# Patient Record
Sex: FEMALE | Race: WHITE
[De-identification: names, ages, dates, MRNs, and addresses within clinical notes are randomized per-mention and may not be internally consistent; named-entity substitution may affect disease eponyms.]

---

## 2017-05-20 ENCOUNTER — HOSPITAL ENCOUNTER (OUTPATIENT)
Dept: HOSPITAL 45 - C.LABPVFM | Age: 62
Discharge: HOME | End: 2017-05-20
Attending: PSYCHIATRY & NEUROLOGY
Payer: COMMERCIAL

## 2017-05-20 DIAGNOSIS — F22: Primary | ICD-10-CM

## 2017-05-20 LAB
ALBUMIN/GLOB SERPL: 1.2 {RATIO} (ref 0.9–2)
ALP SERPL-CCNC: 100 U/L (ref 45–117)
ALT SERPL-CCNC: 32 U/L (ref 12–78)
ANION GAP SERPL CALC-SCNC: 5 MMOL/L (ref 3–11)
AST SERPL-CCNC: 25 U/L (ref 15–37)
BUN SERPL-MCNC: 18 MG/DL (ref 7–18)
BUN/CREAT SERPL: 17.9 (ref 10–20)
CALCIUM SERPL-MCNC: 8.8 MG/DL (ref 8.5–10.1)
CHLORIDE SERPL-SCNC: 111 MMOL/L (ref 98–107)
CHOLEST/HDLC SERPL: 1.8 {RATIO}
CO2 SERPL-SCNC: 29 MMOL/L (ref 21–32)
CREAT SERPL-MCNC: 0.98 MG/DL (ref 0.6–1.2)
GLOBULIN SER-MCNC: 3.1 GM/DL (ref 2.5–4)
GLUCOSE SERPL-MCNC: 88 MG/DL (ref 70–99)
GLUCOSE UR QL: 78 MG/DL
KETONES UR QL STRIP: 54 MG/DL
NITRITE UR QL STRIP: 39 MG/DL (ref 0–150)
PH UR: 140 MG/DL (ref 0–200)
POTASSIUM SERPL-SCNC: 4.3 MMOL/L (ref 3.5–5.1)
SODIUM SERPL-SCNC: 145 MMOL/L (ref 136–145)
VERY LOW DENSITY LIPOPROT CALC: 8 MG/DL

## 2017-07-01 ENCOUNTER — HOSPITAL ENCOUNTER (OUTPATIENT)
Dept: HOSPITAL 45 - C.LABPVFM | Age: 62
Discharge: HOME | End: 2017-07-01
Attending: FAMILY MEDICINE
Payer: COMMERCIAL

## 2017-07-01 DIAGNOSIS — G93.9: Primary | ICD-10-CM

## 2017-07-01 LAB
ALT SERPL-CCNC: 29 U/L (ref 12–78)
CHOLEST/HDLC SERPL: 2.2 {RATIO}
GLUCOSE UR QL: 62 MG/DL
KETONES UR QL STRIP: 61 MG/DL
NITRITE UR QL STRIP: 55 MG/DL (ref 0–150)
PH UR: 134 MG/DL (ref 0–200)
VERY LOW DENSITY LIPOPROT CALC: 11 MG/DL

## 2018-01-20 ENCOUNTER — HOSPITAL ENCOUNTER (OUTPATIENT)
Dept: HOSPITAL 45 - C.LAB | Age: 63
Discharge: HOME | End: 2018-01-20
Attending: FAMILY MEDICINE
Payer: COMMERCIAL

## 2018-01-20 DIAGNOSIS — G93.9: Primary | ICD-10-CM

## 2018-01-20 LAB
ALT SERPL-CCNC: 54 U/L (ref 12–78)
BUN SERPL-MCNC: 12 MG/DL (ref 7–18)
CALCIUM SERPL-MCNC: 9.2 MG/DL (ref 8.5–10.1)
CO2 SERPL-SCNC: 27 MMOL/L (ref 21–32)
CREAT SERPL-MCNC: 0.85 MG/DL (ref 0.6–1.2)
GLUCOSE SERPL-MCNC: 91 MG/DL (ref 70–99)
KETONES UR QL STRIP: 37 MG/DL
PH UR: 119 MG/DL (ref 0–200)
POTASSIUM SERPL-SCNC: 4 MMOL/L (ref 3.5–5.1)
SODIUM SERPL-SCNC: 140 MMOL/L (ref 136–145)

## 2018-02-06 ENCOUNTER — HOSPITAL ENCOUNTER (OUTPATIENT)
Dept: HOSPITAL 45 - C.LAB | Age: 63
Discharge: HOME | End: 2018-02-06
Attending: PSYCHIATRY & NEUROLOGY
Payer: COMMERCIAL

## 2018-02-06 DIAGNOSIS — F33.3: Primary | ICD-10-CM

## 2018-08-06 ENCOUNTER — HOSPITAL ENCOUNTER (INPATIENT)
Dept: HOSPITAL 45 - C.EDB | Age: 63
LOS: 8 days | Discharge: HOME | DRG: 918 | End: 2018-08-14
Attending: FAMILY MEDICINE | Admitting: HOSPITALIST
Payer: COMMERCIAL

## 2018-08-06 VITALS
HEIGHT: 64 IN | WEIGHT: 209 LBS | HEIGHT: 64 IN | BODY MASS INDEX: 35.68 KG/M2 | WEIGHT: 209 LBS | BODY MASS INDEX: 35.68 KG/M2

## 2018-08-06 VITALS
DIASTOLIC BLOOD PRESSURE: 84 MMHG | HEART RATE: 80 BPM | OXYGEN SATURATION: 99 % | SYSTOLIC BLOOD PRESSURE: 143 MMHG | TEMPERATURE: 99.32 F

## 2018-08-06 VITALS
DIASTOLIC BLOOD PRESSURE: 75 MMHG | SYSTOLIC BLOOD PRESSURE: 119 MMHG | OXYGEN SATURATION: 100 % | HEART RATE: 79 BPM | TEMPERATURE: 100.58 F

## 2018-08-06 VITALS
DIASTOLIC BLOOD PRESSURE: 84 MMHG | OXYGEN SATURATION: 99 % | HEART RATE: 80 BPM | TEMPERATURE: 99.32 F | SYSTOLIC BLOOD PRESSURE: 143 MMHG

## 2018-08-06 VITALS — OXYGEN SATURATION: 99 %

## 2018-08-06 DIAGNOSIS — T45.511A: Primary | ICD-10-CM

## 2018-08-06 DIAGNOSIS — F32.9: ICD-10-CM

## 2018-08-06 DIAGNOSIS — N13.30: ICD-10-CM

## 2018-08-06 DIAGNOSIS — F22: ICD-10-CM

## 2018-08-06 DIAGNOSIS — R31.0: ICD-10-CM

## 2018-08-06 DIAGNOSIS — K21.9: ICD-10-CM

## 2018-08-06 DIAGNOSIS — K92.1: ICD-10-CM

## 2018-08-06 DIAGNOSIS — Z92.3: ICD-10-CM

## 2018-08-06 DIAGNOSIS — Z85.3: ICD-10-CM
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
## 2018-08-06 DIAGNOSIS — Z91.14: ICD-10-CM

## 2018-08-06 DIAGNOSIS — N17.9: ICD-10-CM

## 2018-08-06 DIAGNOSIS — Z88.6: ICD-10-CM

## 2018-08-06 DIAGNOSIS — Z86.711: ICD-10-CM

## 2018-08-06 DIAGNOSIS — Z88.8: ICD-10-CM

## 2018-08-06 DIAGNOSIS — Z79.01: ICD-10-CM

## 2018-08-06 DIAGNOSIS — Z88.2: ICD-10-CM

## 2018-08-06 DIAGNOSIS — D62: ICD-10-CM

## 2018-08-06 DIAGNOSIS — Z82.49: ICD-10-CM

## 2018-08-06 LAB
ALBUMIN SERPL-MCNC: 2.9 GM/DL (ref 3.4–5)
ALP SERPL-CCNC: 65 U/L (ref 45–117)
ALT SERPL-CCNC: 17 U/L (ref 12–78)
AST SERPL-CCNC: 20 U/L (ref 15–37)
BASOPHILS # BLD: 0.01 K/UL (ref 0–0.2)
BASOPHILS NFR BLD: 0.2 %
BUN SERPL-MCNC: 65 MG/DL (ref 7–18)
CALCIUM SERPL-MCNC: 9.1 MG/DL (ref 8.5–10.1)
CO2 SERPL-SCNC: 23 MMOL/L (ref 21–32)
CREAT SERPL-MCNC: 5.49 MG/DL (ref 0.6–1.2)
EOS ABS #: 0.02 K/UL (ref 0–0.5)
EOSINOPHIL NFR BLD AUTO: 212 K/UL (ref 130–400)
GLUCOSE SERPL-MCNC: 109 MG/DL (ref 70–99)
HCT VFR BLD CALC: 30 % (ref 37–47)
HGB BLD-MCNC: 10.1 G/DL (ref 12–16)
IG#: 0.02 K/UL (ref 0–0.02)
IMM GRANULOCYTES NFR BLD AUTO: 8.4 %
INR PPP: 1 (ref 0.9–1.1)
INR PPP: > 10 (ref 0.9–1.1)
LYMPHOCYTES # BLD: 0.54 K/UL (ref 1.2–3.4)
MCH RBC QN AUTO: 28.9 PG (ref 25–34)
MCHC RBC AUTO-ENTMCNC: 33.7 G/DL (ref 32–36)
MCV RBC AUTO: 85.7 FL (ref 80–100)
MONO ABS #: 0.31 K/UL (ref 0.11–0.59)
MONOCYTES NFR BLD: 4.8 %
NEUT ABS #: 5.51 K/UL (ref 1.4–6.5)
NEUTROPHILS # BLD AUTO: 0.3 %
NEUTROPHILS NFR BLD AUTO: 86 %
PMV BLD AUTO: 10.7 FL (ref 7.4–10.4)
POTASSIUM SERPL-SCNC: 4.1 MMOL/L (ref 3.5–5.1)
PROT SERPL-MCNC: 6.6 GM/DL (ref 6.4–8.2)
PTT PATIENT: 182.3 SECONDS (ref 21–31)
RED CELL DISTRIBUTION WIDTH CV: 13.8 % (ref 11.5–14.5)
RED CELL DISTRIBUTION WIDTH SD: 43.4 FL (ref 36.4–46.3)
SODIUM SERPL-SCNC: 136 MMOL/L (ref 136–145)
WBC # BLD AUTO: 6.41 K/UL (ref 4.8–10.8)

## 2018-08-06 RX ADMIN — SODIUM CHLORIDE SCH MLS/HR: 900 INJECTION, SOLUTION INTRAVENOUS at 21:00

## 2018-08-06 NOTE — DIAGNOSTIC IMAGING REPORT
CHEST ONE VIEW PORTABLE



CLINICAL HISTORY: chest pain dyspnea



COMPARISON STUDY:  6/6/2018



FINDINGS: Lungs are clear. Postoperative changes left axilla. Diaphragms smooth.





IMPRESSION:  No acute process. 











The above report was generated using voice recognition software.  It may contain

grammatical, syntax or spelling errors.









Electronically signed by:  Avery Howard M.D.

8/6/2018 5:25 PM



Dictated Date/Time:  8/6/2018 5:25 PM

## 2018-08-06 NOTE — EMERGENCY ROOM VISIT NOTE
History


First contact with patient:  12:37


Chief Complaint:  ILLNESS


Stated Complaint:  BOWELS BLOCKED,BLOOD IN URIN,SOB,SORE THROAT,FAINT





History of Present Illness


The patient is a 62 year old female who presents to the Emergency Room with 

complaints of hematuria, hematochezia, and abdominal pain. The patient reports 

symptoms for approximately 1 week. She states she has intermittently noticed 

blood in her urine and stool, but these symptoms became worse in the past 3 

days. She states she is currently on Coumadin for history of PE's. She stopped 

taking her coumadin 3 days ago due to the blood in her urine and being out of 

town. She reports noticing bruising of her lower abdomen overnight and has felt 

as if she will faint. She reports "breathing problems" which she is unable to 

describe as well as "chest discomfort." The patient reports symptoms are 

similar to history of PE's she has had in the past. She reports associated 

fatigue, and edema and reports a sensation of fluid in her back. She reports 

pain in her abdomen and legs and describes it as achy and rates it 3/10. She 

has taken no medications for pain. Last INR was approximately 2 weeks ago and 

she states was 2.4. She denies any trauma or injury. She denies diarrhea, 

constipation, numbness or tingling, dysuria, urinary frequency, urinary 

hesitancy, flank pain, or other concerning symptoms.





Review of Systems


A complete 10 point review of systems was reviewed with the patient with 

pertinent positives and negatives as per history of present illness. All else 

were negative.





Past Medical/Surgical History


Medical Problems:


(1) Allergic asthma


(2) Breast CA


(3) Chest pain


(4) Depression


(5) Elevated INR


(6) GERD (gastroesophageal reflux disease)


(7) Lymphedema


(8) Pulmonary embolism


(9) Renal hemorrhage, right


(10) Schizophrenia


(11) Varicose veins during pregnancy, antepartum


Surgical Problems:


(1) S/P mastectomy, bilateral


(2) S/P SHAMEKA-BSO








Family History





Heart disease


Hypertension





Social History


Smoking Status:  Never Smoker


Smokeless Tobacco Use:  No


Alcohol Use:  none


Drug Use:  none


Marital Status:  single


Housing Status:  lives with family


Occupation Status:  retired





Current/Historical Medications


Scheduled


Atorvastatin (Lipitor), 10 MG PO DAILY


Cholecalciferol (Vitamin D3), 1 TAB PO DAILY


Lurasidone Hcl (Latuda), 60 MG PO DAILY


Warfarin Sod (Jantoven), 7.5 MG PO 6XWK


Warfarin Sodium (Warfarin Sodium), 5 MG PO WK





Scheduled PRN


Albuterol Hfa (Ventolin Hfa), 2 PUFFS INH Q6H PRN for Shortness of Breath


Fluticasone Propionate (Nasal) (Flonase Allergy Relief), 2 SPRAYS KATIA QAM PRN 

for ALLGERY SYMPTOMS


Loratadine & Pseudoephedrine (Loratadine-D 12HR), 10 MG PO QAM PRN for Allergy 

Symptoms


Mometasone Furoate (Inhalation (Asmanex Twisthaler 120 Me), 1 PUFF INH DAILY 

PRN for Nasal Congestion


Polyethylene Glycol-Propylene (Systane), 2 DROPS OPB BID PRN for DRYNESS


Rabeprazole Sodium (Aciphex), 20 MG PO QAM PRN for Reflux





Physical Exam


Vital Signs











  Date Time  Temp Pulse Resp B/P (MAP) Pulse Ox O2 Delivery O2 Flow Rate FiO2


 


8/6/18 17:00  72 16 145/103 98 Room Air  


 


8/6/18 15:01  75 16 134/76 97 Room Air  


 


8/6/18 14:01    135/91    


 


8/6/18 13:53  76      


 


8/6/18 13:41  76 19  99   


 


8/6/18 13:33    120/77    


 


8/6/18 13:07     96 Room Air  


 


8/6/18 12:08 37.3 86 18 118/81 96 Room Air  











Physical Exam


VITALS: Vitals are noted on the nurse's note and reviewed by myself.  Vital 

signs stable.


GENERAL: This is a 62-year-old white female, in no acute distress, 

nondiaphoretic, well-developed well-nourished.


SKIN: Ecchymosis over the suprapubic area.  There is also ecchymosis to a 

lesser degree on the bilateral lower extremities.  Otherwise the skin was 

without rashes, erythema, edema, or bruising.  There is no tenting of the skin.

  Capillary reflex less than 2 seconds.


HEAD: Normocephalic atraumatic.  


EARS: External auditory canals clear, tympanic membranes pearly gray without 

erythema or effusion bilaterally.


EYES: Pupils equal round and reactive to light and accommodation.  Conjunctivae 

without injection, sclerae without icterus.  Extraocular movements intact.  


NOSE: Patent, turbinates without inflammation or discharge.  No sinus 

tenderness.


MOUTH: Mucous membranes moist.  Tonsils are not enlarged.  Pharynx without 

erythema or exudate.  Uvula midline.  Airway patent.  Tongue does not deviate.  


NECK: Supple without nuchal rigidity.  No lymphadenopathy.  No thyromegaly.  

Cervical spine is nontender.  No JVD.


HEART: Regular rate and rhythm without murmurs gallops or rubs.


LUNGS: Clear to auscultation bilaterally without wheezes, rales or rhonchi.  No 

dullness to percussion.  No retractions or accessory muscle use.


ABDOMEN: Positive bowel sounds x 4.  Normal tympanic percussion.  Suprapubic 

tenderness to palpation.  Abdomen is otherwise soft, nontender, without masses 

or organomegaly.  Campo sign negative.  No guarding or rebound tenderness.


MUSCULOSKELETAL: No muscle atrophy, erythema, or edema noted.  Full range of 

motion without joint tenderness in all extremities.  No tenderness to 

palpation.  Normal gait.  Strength 5/5 throughout.


NEURO: Patient was alert and oriented to person place and time.  Normal 

sensation to light and sharp touch.  Deep tendon reflexes 2+ throughout.  No 

focal neurological deficits.





Medical Decision & Procedures


ER Provider


Diagnostic Interpretation:


ABD/PELVIS NO IV OR ORAL CONT





CT DOSE: 691.69 mGy.cm





HISTORY: Pain  abdominal pain, hematuria, hematochezia, on coumadin





TECHNIQUE: Multiaxial CT images of the abdomen and pelvis were performed without


contrast.  A dose lowering technique was utilized adhering to the principles of


ALARA.








COMPARISON STUDY: None.





FINDINGS: Lung bases are clear. Liver is homogeneous throughout. Pancreas


appears unremarkable. Gallbladder is contracted.





Both kidneys are enlarged compared to the prior exam. There is high density


material within the renal collecting systems. There are findings of perinephric


infiltrative changes bilaterally.





Both ureters are moderately distended and again with high density material


within. This presumably represents blood. Bladder is relatively collapsed. There


are nonspecific infiltrative changes of the subcutaneous fat over the left


inguinal region. There is trace amount of free fluid within the pelvic


cul-de-sac.





IMPRESSION: 





1. Interval increase in size of both kidneys demonstrating diffuse heterogeneity


as well as considerable infiltrative change of the perinephric fat]


2. In addition, there is distention of the renal collecting systems with


hyperdense material with dilatation of the ureters throughout their length again


containing high density material components.





Trace amount of free fluid within the pelvic cul-de-sac.


3. Nonobstructive bowel pattern.








4. This appearance is most consistent with that of spontaneous bilateral renal


hemorrhage with extension of blood to the perinephric spaces as well as the


renal collecting systems and ureters.














The above report was generated using voice recognition software.  It may contain


grammatical, syntax or spelling errors.











Electronically signed by:  Avery Howard M.D.


8/6/2018 3:52 PM





Dictated Date/Time:  8/6/2018 3:44 PM








CHEST ONE VIEW PORTABLE





CLINICAL HISTORY: chest pain dyspnea





COMPARISON STUDY:  6/6/2018





FINDINGS: Lungs are clear. Postoperative changes left axilla. Diaphragms smooth.








IMPRESSION:  No acute process. 

















The above report was generated using voice recognition software.  It may contain


grammatical, syntax or spelling errors.














Electronically signed by:  Avery Howard M.D.


8/6/2018 5:25 PM





Dictated Date/Time:  8/6/2018 5:25 PM





Laboratory Results


8/6/18 13:14








Red Blood Count 3.50, Mean Corpuscular Volume 85.7, Mean Corpuscular Hemoglobin 

28.9, Mean Corpuscular Hemoglobin Concent 33.7, Mean Platelet Volume 10.7, 

Neutrophils (%) (Auto) 86.0, Lymphocytes (%) (Auto) 8.4, Monocytes (%) (Auto) 

4.8, Eosinophils (%) (Auto) 0.3, Basophils (%) (Auto) 0.2, Neutrophils # (Auto) 

5.51, Lymphocytes # (Auto) 0.54, Monocytes # (Auto) 0.31, Eosinophils # (Auto) 

0.02, Basophils # (Auto) 0.01





8/6/18 13:14

















Test


  8/6/18


13:14 8/6/18


13:15 8/6/18


18:07


 


White Blood Count


  6.41 K/uL


(4.8-10.8) 


  


 


 


Red Blood Count


  3.50 M/uL


(4.2-5.4) 


  


 


 


Hemoglobin


  10.1 g/dL


(12.0-16.0) 


  


 


 


Hematocrit 30.0 % (37-47)   


 


Mean Corpuscular Volume


  85.7 fL


() 


  


 


 


Mean Corpuscular Hemoglobin


  28.9 pg


(25-34) 


  


 


 


Mean Corpuscular Hemoglobin


Concent 33.7 g/dl


(32-36) 


  


 


 


Platelet Count


  212 K/uL


(130-400) 


  


 


 


Mean Platelet Volume


  10.7 fL


(7.4-10.4) 


  


 


 


Neutrophils (%) (Auto) 86.0 %   


 


Lymphocytes (%) (Auto) 8.4 %   


 


Monocytes (%) (Auto) 4.8 %   


 


Eosinophils (%) (Auto) 0.3 %   


 


Basophils (%) (Auto) 0.2 %   


 


Neutrophils # (Auto)


  5.51 K/uL


(1.4-6.5) 


  


 


 


Lymphocytes # (Auto)


  0.54 K/uL


(1.2-3.4) 


  


 


 


Monocytes # (Auto)


  0.31 K/uL


(0.11-0.59) 


  


 


 


Eosinophils # (Auto)


  0.02 K/uL


(0-0.5) 


  


 


 


Basophils # (Auto)


  0.01 K/uL


(0-0.2) 


  


 


 


RDW Standard Deviation


  43.4 fL


(36.4-46.3) 


  


 


 


RDW Coefficient of Variation


  13.8 %


(11.5-14.5) 


  


 


 


Immature Granulocyte % (Auto) 0.3 %   


 


Immature Granulocyte # (Auto)


  0.02 K/uL


(0.00-0.02) 


  


 


 


Activated Partial


Thromboplast Time 182.3 SECONDS


(21.0-31.0) 


  


 


 


Partial Thromboplastin Ratio 6.9   


 


Anion Gap


  10.0 mmol/L


(3-11) 


  


 


 


Est Creatinine Clear Calc


Drug Dose 12.2 ml/min 


  


  


 


 


Estimated GFR (


American) 8.8 


  


  


 


 


Estimated GFR (Non-


American 7.6 


  


  


 


 


BUN/Creatinine Ratio 11.7 (10-20)   


 


Calcium Level


  9.1 mg/dl


(8.5-10.1) 


  


 


 


Magnesium Level


  2.3 mg/dl


(1.8-2.4) 


  


 


 


Total Bilirubin


  0.8 mg/dl


(0.2-1) 


  


 


 


Aspartate Amino Transf


(AST/SGOT) 20 U/L (15-37) 


  


  


 


 


Alanine Aminotransferase


(ALT/SGPT) 17 U/L (12-78) 


  


  


 


 


Alkaline Phosphatase


  65 U/L


() 


  


 


 


Pro-B-Type Natriuretic Peptide


  1262 pg/ml


(0-900) 


  


 


 


Total Protein


  6.6 gm/dl


(6.4-8.2) 


  


 


 


Albumin


  2.9 gm/dl


(3.4-5.0) 


  


 


 


Globulin


  3.7 gm/dl


(2.5-4.0) 


  


 


 


Albumin/Globulin Ratio 0.8 (0.9-2)   


 


Urine Color  RED  


 


Urine Appearance  TURBID (CLEAR)  


 


Urine pH   (4.5-7.5)  


 


Urine Specific Gravity


  


  1.016


(1.000-1.030) 


 


 


Urine Protein   (NEG)  


 


Urine Glucose (UA)   (NEG)  


 


Urine Ketones   (NEG)  


 


Urine Occult Blood   (NEG)  


 


Urine Nitrite   (NEG)  


 


Urine Bilirubin   (NEG)  


 


Urine Urobilinogen   (NEG)  


 


Urine Leukocyte Esterase   (NEG)  


 


Urine RBC  >30 /hpf (0-4)  


 


Urine WBC  >30 /hpf (0-5)  


 


Urine Epithelial Cells


  


  20-30 /lpf


(0-5) 


 


 


Urine Bacteria  1+ (NEG)  


 


Urine Opiates Screen  NEG (NEG)  


 


Urine Methadone, Qualitative  NEG (NEG)  


 


Urine Barbiturates  NEG (NEG)  


 


Urine Phencyclidine (PCP)


Level 


  NEG (NEG) 


  


 


 


Ur


Amphetamine/Methamphetamine 


  NEG (NEG) 


  


 


 


MDMA (Ecstasy) Screen  NEG (NEG)  


 


Urine Benzodiazepines Screen  NEG (NEG)  


 


Urine Cocaine Metabolite  NEG (NEG)  


 


Urine Marijuana (THC)  NEG (NEG)  











Medications Administered











 Medications


  (Trade)  Dose


 Ordered  Sig/Angely


 Route  Start Time


 Stop Time Status Last Admin


Dose Admin


 


 Sodium Chloride  1,000 ml @ 


 999 mls/hr  Q1H1M STAT


 IV  8/6/18 12:56


 8/6/18 13:56 DC 8/6/18 13:23


999 MLS/HR


 


 Phytonadione 10


 mg/Sodium Chloride  51 ml @ 


 101 mls/hr  ONE  ONCE


 IV  8/6/18 14:45


 8/6/18 15:15 DC 8/6/18 14:56


101 MLS/HR


 


 Sodium Chloride  1,000 ml @ 


 999 mls/hr  Q1H1M STAT


 IV  8/6/18 16:21


 8/6/18 17:21 DC 8/6/18 16:28


999 MLS/HR


 


 Phytonadione


  (Mephyton Tab)  5 mg  NOW  STAT


 PO  8/6/18 16:39


 8/6/18 16:41 DC 8/6/18 17:26


5 MG


 


 Prothrombin


 Complex Concent


  (Human) 5000 unit/


 Syringe  200 ml @ 


 10 mls/min  TODAY@1715


 IV  8/6/18 17:15


 8/6/18 19:00 DC 8/6/18 17:26


10 MLS/MIN











ECG Per My Interpretation


Indication:  chest pain


Rate (beats per minute):  76


Rhythm:  normal sinus


Findings:  no acute ischemic change, no ectopy


Comparison ECG Date:  06/06/18


Change:  no significant change





ED Course


The patient was seen and evaluated as above.





IV access obtained, labs drawn.





Labs reviewed by myself.  The patient's creatinine was greater than 5, so a CT 

scan changed from CT with IV contrast to CT of the abdomen/pelvis without 

contrast.





I consulted with Dr. Delcid regarding the elevated INR and patient's 

creatinine.  She did recommend starting 10 mg IV vitamin K.  She did see and 

evaluate the patient.





Upon initiation of vitamin K, the patient immediately began experiencing chest 

discomfort and dyspnea.  Vitamin K was discontinued temporarily.  Repeat EKG 

performed and showed a ventricular rate of 72 bpm.  This was normal sinus 

rhythm with no acute ischemic changes.  There is no significant change from the 

previous EKG from today.





Imaging performed and reviewed by myself and radiologist as above.





I discussed the findings with the patient at bedside.  I recommended we restart 

the IV vitamin K slowly and monitor closely.  The patient was agreeable.  I 

consulted again with Dr. Delcid.  She recommended restarting the IV vitamin K 

and giving the patient 5 mg oral vitamin K.  She also recommended starting K 

Centra at 5000 units.





The patient did tolerate the IV vitamin K without adverse reaction.





I discussed the case with the [facility] hospitalist, Dr. Pinedo.  She did 

agree to accept the patient to her service and states she will consult with 

nephrology/urology.  I did advise her of Dr. Delcid's involvement and 

recommendations.





Throughout the course of the patient's care, I did work very closely with Dr. Mckeon.





Please see Dr. Pinedo's dictation regarding admission and ongoing care and 

management of this patient.





Medical Decision


This is a 62-year-old female patient presents emergency department today with 

multiple complaints.  She reports blood in her urine and stool which appears to 

be the primary complaint.  She is currently anticoagulated on Coumadin due to 

previous pulmonary emboli.  On examination, the patient was stable, and minimal 

pain, no distress, and vital signs were without tachycardia or hypotension.  

EKG did not reveal any acute findings or concerns for etiology of the patient's 

symptoms.  Laboratory workup was concerning for some very mild anemia with a 

hemoglobin of 10.  No leukocytosis or thrombocytopenia.  CMP did not reveal any 

significant hepatic or electrolyte abnormalities.  The patient's creatinine was 

significantly elevated at 5.49.  Comparatively, the patient had a normal 

creatinine of 1.13 in June of this year.  The patient's BNP is elevated at 

1262.  Albumin low at 2.9.  The patient's initial INR was greater than 10 with 

a PT greater than 100.  APTT was 182.3.  Comparatively, the patient had an INR 

of 2.5 on July 18.  Repeat INR after being given IV vitamin K and K Centra was 

1.0 with a PT of 10.2.  The patient was seen and evaluated by Dr. Delcid, who 

reports a psychiatric history with concern for malingering versus drug abuse in 

the past.  She did recommend a urine drug screen.  This was performed and was 

negative.  The patient's urinalysis showed a turbid, red urine with greater 

than 30 red blood cells, greater than 30 white blood cells, and 1+ bacteria.  

Chest CT scan was performed and was concerning for distention of the renal 

collecting systems with hyperdense material and dilation of the ureters 

throughout their length, concerning for blood.  I suspect this is related to 

the patient's elevated INR.  The history is concerning and unclear. The patient 

reports not taking her Warfarin the past 3 days, however, it is unclear if she 

was taking it properly prior to this. She states her diet has been different 

recently, so perhaps this is contributing to the elevated INR. The patient will 

require admission due to DAVID and for medication management to stabilize her INR 

back to a therapeutic level. The patient was agreeable.





Etiologies such as intra-abdominal hemorrhage, hypocoagulable state, 

appendicitis, diverticulitis,  obstruction, inflammatory bowel disease, renal 

colic, PUD, biliary pathology, renal pathology, pancreatitis, mesenteric 

ischemia, aortic pathology, infections, genitourinary, UTI, perforated viscus, 

cardiac ischemia, aortic dissection, pulmonary embolism, pneumonia, pneumothorax

, musculoskeletal, infections, gastrointestinal, as well as others were 

entertained.  





The chart was completed utilizing Dragon Speech voice recognition software. 

Grammatical errors, random word insertions, pronoun errors, and incomplete 

sentences are an occasional consequence of this system due to software 

limitations, ambient noise, and hardware issues. Any formal questions or 

concerns about the content, text, or information contained within the body of 

this dictation should be directly addressed to the provider for clarification.





Medication Reconcilliation


Current Medication List:  was personally reviewed by me





Blood Pressure Screening


Patient's blood pressure:  Normal blood pressure





Impression





 Primary Impression:  


 Elevated INR


 Additional Impressions:  


 Anticoagulated on warfarin


 Acute kidney injury


 Renal hematuria


 Anemia





Departure Information


Dispostion


Admitted as an inpatient





Condition


GOOD





Referrals


Dee Dee You MD (PCP)





Patient Instructions


[facility]





Problem Qualifiers








 Additional Impressions:  


 Anemia


 Anemia type:  unspecified type  Qualified Codes:  D64.9 - Anemia, unspecified

## 2018-08-06 NOTE — DIAGNOSTIC IMAGING REPORT
ABD/PELVIS NO IV OR ORAL CONT



CT DOSE: 691.69 mGy.cm



HISTORY: Pain  abdominal pain, hematuria, hematochezia, on coumadin



TECHNIQUE: Multiaxial CT images of the abdomen and pelvis were performed without

contrast.  A dose lowering technique was utilized adhering to the principles of

ALARA.





COMPARISON STUDY: None.



FINDINGS: Lung bases are clear. Liver is homogeneous throughout. Pancreas

appears unremarkable. Gallbladder is contracted.



Both kidneys are enlarged compared to the prior exam. There is high density

material within the renal collecting systems. There are findings of perinephric

infiltrative changes bilaterally.



Both ureters are moderately distended and again with high density material

within. This presumably represents blood. Bladder is relatively collapsed. There

are nonspecific infiltrative changes of the subcutaneous fat over the left

inguinal region. There is trace amount of free fluid within the pelvic

cul-de-sac.



IMPRESSION: 



1. Interval increase in size of both kidneys demonstrating diffuse heterogeneity

as well as considerable infiltrative change of the perinephric fat]

2. In addition, there is distention of the renal collecting systems with

hyperdense material with dilatation of the ureters throughout their length again

containing high density material components.



Trace amount of free fluid within the pelvic cul-de-sac.

3. Nonobstructive bowel pattern.





4. This appearance is most consistent with that of spontaneous bilateral renal

hemorrhage with extension of blood to the perinephric spaces as well as the

renal collecting systems and ureters.









The above report was generated using voice recognition software.  It may contain

grammatical, syntax or spelling errors.







Electronically signed by:  Avery Howard M.D.

8/6/2018 3:52 PM



Dictated Date/Time:  8/6/2018 3:44 PM

## 2018-08-06 NOTE — HISTORY AND PHYSICAL
History & Physical


Date & Time of Service:


Aug 6, 2018 at 20:53


Chief Complaint:


Elevated Inr


Primary Care Physician:


Dee Dee You MD


History of Present Illness


Source:  patient


Ms. Bolanos is a 63yo female with history of multiple PEs on lifelong 

anticoagulation with Coumadin presenting with elevated INR of > 10 as well as 

spontaneous bleeding.  Patient reports 1 week of decreased energy as well as 

hematuria, bright red and sometimes pink, no clots and melena x 3 episodes (3 

August, 5 August and this AM).  Also with diffuse body pain, back pain and 

joint pain and lower abdominal pain.  She has been experiencing shortness of 

breath and presyncope over the last 4 days as well as easy bruising.  Patient 

reports not taking her Coumadin for the last 3 days due to concern for 

bleeding.  





On arrival to the ER her INR was found to be > 10.  Last INR 2 weeks ago was 

2.4.  IV Vitamin K was administered with complaint of flushing





ER Course:  PCC, Vitamin K 10mg IV, Vitamin K 5mg PO, NSS x 2 liters





Past Medical/Surgical History


Medical Problems:


Allergic asthma


PE


Chronic anticoagulation


GERD


Breast CA s/p bilateral mastectomy, chemo, XRT


Depression


Schizophrenia


Lymphedema





Surgical Problems:


(1) S/P mastectomy, bilateral


(2) S/P SHAMEKA-BSO


Cataract - bilateral


Bladder repair





Family History





Heart disease


Hypertension





Social History


Smoking Status:  Never Smoker


Smokeless Tobacco Use:  No


Drug Use:  none


Marital Status:  single


Housing status:  lives with family


Occupational Status:  retired





Immunizations


History of Influenza Vaccine:  N/A


History of Tetanus Vaccine?:  No


History of Pneumococcal:  Yes


History of Hepatitis B Vaccine:  No





Allergies


Coded Allergies:  


     Celecoxib (Verified  Allergy, Severe, airway edema, 8/6/18)


     Acarides (Mites) (Verified  Allergy, Mild, unknown, 8/6/18)


     Cat Dander (Verified  Allergy, Mild, unknown, 8/6/18)


     Dog Dander (Verified  Allergy, Mild, unknown, 8/6/18)


     Dust (Verified  Allergy, Mild, unknown, 8/6/18)


     Grass (Verified  Allergy, Mild, unknown, 8/6/18)


     Molds & Smuts (Verified  Allergy, Mild, unknown, 8/6/18)


     Clindamycin (Verified  Allergy, Unknown, unknown, 8/6/18)


     Sulfa Antibiotics (Verified  Allergy, Unknown, ., 8/6/18)


     Amoxicillin (Verified  Adverse Reaction, Mild, abdominal pain, 8/6/18)


     Ibuprofen (Verified  Adverse Reaction, Unknown, unknown, 8/6/18)


     Rofecoxib (Verified  Adverse Reaction, Unknown, unknown, 8/6/18)





Home Medications


Scheduled


Atorvastatin (Lipitor), 10 MG PO DAILY


Cholecalciferol (Vitamin D3), 1 TAB PO DAILY


Lurasidone Hcl (Latuda), 60 MG PO DAILY


Warfarin Sod (Jantoven), 7.5 MG PO 6XWK


Warfarin Sodium (Warfarin Sodium), 5 MG PO WK





Scheduled PRN


Albuterol Hfa (Ventolin Hfa), 2 PUFFS INH Q6H PRN for Shortness of Breath


Fluticasone Propionate (Nasal) (Flonase Allergy Relief), 2 SPRAYS KATIA QAM PRN 

for ALLGERY SYMPTOMS


Loratadine & Pseudoephedrine (Loratadine-D 12HR), 10 MG PO QAM PRN for Allergy 

Symptoms


Mometasone Furoate (Inhalation (Asmanex Twisthaler 120 Me), 1 PUFF INH DAILY 

PRN for Nasal Congestion


Polyethylene Glycol-Propylene (Systane), 2 DROPS OPB BID PRN for DRYNESS


Rabeprazole Sodium (Aciphex), 20 MG PO QAM PRN for Reflux





Review of Systems


Constitutional:  + problem reported (reports shaking since arriving to ER), No 

fever, No chills, No sweats


Eyes:  No worsening of vision, No eye pain


ENT:  + sore throat, No hearing loss, No unusual epistaxis


Respiratory:  + shortness of breath, No cough


Abdomen:  No pain, No nausea, No vomiting, No diarrhea, No constipation


Musculoskeletal:  + joint pain, + muscle pain


Genitourinary - Female:  + hematuria


Neurologic:  No numbness/tingling


Endocrine:  + fatigue


Hematologic / Lymphatic:  + abnormal bleeding/bruising


Integumentary:  No rash





Physical Exam


Vital Signs











  Date Time  Temp Pulse Resp B/P (MAP) Pulse Ox O2 Delivery O2 Flow Rate FiO2


 


8/6/18 20:08 37.4 80 18 143/84 99 Room Air  


 


8/6/18 18:45 37.4 80 18 143/84 (103) 99 Room Air  


 


8/6/18 18:27  70 18 156/76 99   


 


8/6/18 17:00  72 16 145/103 98 Room Air  


 


8/6/18 15:01  75 16 134/76 97 Room Air  


 


8/6/18 14:01    135/91    


 


8/6/18 13:53  76      


 


8/6/18 13:41  76 19  99   


 


8/6/18 13:33    120/77    


 


8/6/18 13:07     96 Room Air  


 


8/6/18 12:08 37.3 86 18 118/81 96 Room Air  











Diagnostics


Laboratory Results





Results Past 24 Hours








Test


  8/6/18


13:14 8/6/18


13:15 8/6/18


18:07 8/6/18


19:02 Range/Units


 


 


White Blood Count 6.41    4.8-10.8  K/uL


 


Red Blood Count 3.50    4.2-5.4  M/uL


 


Hemoglobin 10.1    12.0-16.0  g/dL


 


Hematocrit 30.0    37-47  %


 


Mean Corpuscular Volume 85.7      fL


 


Mean Corpuscular Hemoglobin 28.9    25-34  pg


 


Mean Corpuscular Hemoglobin


Concent 33.7


  


  


  


  32-36  g/dl


 


 


Platelet Count 212    130-400  K/uL


 


Mean Platelet Volume 10.7    7.4-10.4  fL


 


Neutrophils (%) (Auto) 86.0     %


 


Lymphocytes (%) (Auto) 8.4     %


 


Monocytes (%) (Auto) 4.8     %


 


Eosinophils (%) (Auto) 0.3     %


 


Basophils (%) (Auto) 0.2     %


 


Neutrophils # (Auto) 5.51    1.4-6.5  K/uL


 


Lymphocytes # (Auto) 0.54    1.2-3.4  K/uL


 


Monocytes # (Auto) 0.31    0.11-0.59  K/uL


 


Eosinophils # (Auto) 0.02    0-0.5  K/uL


 


Basophils # (Auto) 0.01    0-0.2  K/uL


 


RDW Standard Deviation 43.4    36.4-46.3  fL


 


RDW Coefficient of Variation 13.8    11.5-14.5  %


 


Immature Granulocyte % (Auto) 0.3     %


 


Immature Granulocyte # (Auto) 0.02    0.00-0.02  K/uL


 


Prothrombin Time


  > 100.0


  


  


  10.2


  9.0-12.0


SECONDS


 


Prothromb Time International


Ratio > 10.0


  


  


  1.0


  0.9-1.1  


 


 


Activated Partial


Thromboplast Time 182.3


  


  


  


  21.0-31.0


SECONDS


 


Partial Thromboplastin Ratio 6.9     


 


Sodium Level 136    136-145  mmol/L


 


Potassium Level 4.1    3.5-5.1  mmol/L


 


Chloride Level 103      mmol/L


 


Carbon Dioxide Level 23    21-32  mmol/L


 


Anion Gap 10.0    3-11  mmol/L


 


Blood Urea Nitrogen 65    7-18  mg/dl


 


Creatinine


  5.49


  


  


  


  0.60-1.20


mg/dl


 


Est Creatinine Clear Calc


Drug Dose 12.2


  


  


  


   ml/min


 


 


Estimated GFR (


American) 8.8


  


  


  


   


 


 


Estimated GFR (Non-


American 7.6


  


  


  


   


 


 


BUN/Creatinine Ratio 11.7    10-20  


 


Random Glucose 109    70-99  mg/dl


 


Calcium Level 9.1    8.5-10.1  mg/dl


 


Magnesium Level 2.3    1.8-2.4  mg/dl


 


Total Bilirubin 0.8    0.2-1  mg/dl


 


Aspartate Amino Transf


(AST/SGOT) 20


  


  


  


  15-37  U/L


 


 


Alanine Aminotransferase


(ALT/SGPT) 17


  


  


  


  12-78  U/L


 


 


Alkaline Phosphatase 65      U/L


 


Pro-B-Type Natriuretic Peptide 1262    0-900  pg/ml


 


Total Protein 6.6    6.4-8.2  gm/dl


 


Albumin 2.9    3.4-5.0  gm/dl


 


Globulin 3.7    2.5-4.0  gm/dl


 


Albumin/Globulin Ratio 0.8    0.9-2  


 


Urine Color  RED    


 


Urine Appearance  TURBID   CLEAR  


 


Urine pH     4.5-7.5  


 


Urine Specific Gravity  1.016   1.000-1.030  


 


Urine Protein     NEG  


 


Urine Glucose (UA)     NEG  


 


Urine Ketones     NEG  


 


Urine Occult Blood     NEG  


 


Urine Nitrite     NEG  


 


Urine Bilirubin     NEG  


 


Urine Urobilinogen     NEG  


 


Urine Leukocyte Esterase     NEG  


 


Urine RBC  >30   0-4  /hpf


 


Urine WBC  >30   0-5  /hpf


 


Urine Epithelial Cells  20-30   0-5  /lpf


 


Urine Bacteria  1+   NEG  


 


Urine Opiates Screen  NEG   NEG  


 


Urine Methadone, Qualitative  NEG   NEG  


 


Urine Barbiturates  NEG   NEG  


 


Urine Phencyclidine (PCP)


Level 


  NEG


  


  


  NEG  


 


 


Ur


Amphetamine/Methamphetamine 


  NEG


  


  


  NEG  


 


 


MDMA (Ecstasy) Screen  NEG   NEG  


 


Urine Benzodiazepines Screen  NEG   NEG  


 


Urine Cocaine Metabolite  NEG   NEG  


 


Urine Marijuana (THC)  NEG   NEG  








Microbiology Results


8/6/18 Urine Culture, Received


         Pending





Diagnostic Radiology


CHEST ONE VIEW PORTABLE





CLINICAL HISTORY: chest pain dyspnea





COMPARISON STUDY:  6/6/2018





FINDINGS: Lungs are clear. Postoperative changes left axilla. Diaphragms smooth.








IMPRESSION:  No acute process. 


********************************************************************************

*******************************************


ABD/PELVIS NO IV OR ORAL CONT





CT DOSE: 691.69 mGy.cm





HISTORY: Pain  abdominal pain, hematuria, hematochezia, on coumadin





TECHNIQUE: Multiaxial CT images of the abdomen and pelvis were performed without


contrast.  A dose lowering technique was utilized adhering to the principles of


ALARA.








COMPARISON STUDY: None.





FINDINGS: Lung bases are clear. Liver is homogeneous throughout. Pancreas


appears unremarkable. Gallbladder is contracted.





Both kidneys are enlarged compared to the prior exam. There is high density


material within the renal collecting systems. There are findings of perinephric


infiltrative changes bilaterally.





Both ureters are moderately distended and again with high density material


within. This presumably represents blood. Bladder is relatively collapsed. There


are nonspecific infiltrative changes of the subcutaneous fat over the left


inguinal region. There is trace amount of free fluid within the pelvic


cul-de-sac.





IMPRESSION: 





1. Interval increase in size of both kidneys demonstrating diffuse heterogeneity


as well as considerable infiltrative change of the perinephric fat]


2. In addition, there is distention of the renal collecting systems with


hyperdense material with dilatation of the ureters throughout their length again


containing high density material components.





Trace amount of free fluid within the pelvic cul-de-sac.


3. Nonobstructive bowel pattern.








4. This appearance is most consistent with that of spontaneous bilateral renal


hemorrhage with extension of blood to the perinephric spaces as well as the


renal collecting systems and ureters.





EKG


The study demonstrates NSR at 76bpm, normal axis and intervals, no evidence of 

ischemia





Impression


Assessment and Plan


63yo female presenting with anemia, hematuria with suggestion of spontaneous 

renal hemorrhage, melena in setting of INR >10.  Patient reports compliance 

with her Coumadin dosage (7.5mg daily except on Sunday 5mg), no new medications

, no supplements or OTCs, no dietary changes, no ingestions reported.  

Administered IV and oral Vitamin K as well as KCentra.





1.  Super therapeutic INR with spontaneous bleeding - patient presently 

hemodynamically stable, s/p IV and PO Vitamin K and KCentra.  Hg=10 and Hct=30 

which is down from 15.5 and 45.8, respectively, on 6/6/18. 


-Hold Coumadin


-Check INR daily


-Trend CBC q 12 hours, transfuse for Hg <7, active bleeding


-Check Coumadin level - reference lab/send out


-Hematology consultation - appreciate assistance with this case





2.  DAVID - BUN=65, Cr-5.49 in setting of spontaneous renal hemorrhage


-Treatment as above


-Patient received NSS x 2 liters in ER, continue IVF, NSS at 80mL/hr x 2 liters


-Monitor I/O, BUN/Cr and electrolyts


-Avoid nephrotoxic agents


-Renal dosing where appropriate





3.  Melena - in setting of INR > 10


-Check FOBT


-Protonix 40mg IV daily while increased risk for bleeding





4.  Anemia - Hg=10, Hct=30 


-Due to acute blood loss


-Continue to monitor BID


-Transfuse if < 7, active bleeding or symptomatic





5.  Depression/Schizophrenia


-Continue Latuda





6.  Asthma - stable,


-Continue to monitor





7.  F/E/N - NSS at 80mL/hr, monitor electrolytes and replete as needed, regular 

diet as tolerated





8.  Ppx - TEDs to bilateral LE, Protonix





9.  Code - DNR per discussion with patient





10.  Dispo - admit to telemetry





Advanced Directives


Existing Living Will:  Yes


Existing Power of :  Yes





Resuscitation Status


DNR





VTE Prophylaxis


Will order VTE Prophylaxis:  Yes

## 2018-08-07 VITALS
SYSTOLIC BLOOD PRESSURE: 125 MMHG | DIASTOLIC BLOOD PRESSURE: 81 MMHG | OXYGEN SATURATION: 98 % | HEART RATE: 86 BPM | TEMPERATURE: 98.96 F

## 2018-08-07 VITALS
HEART RATE: 70 BPM | OXYGEN SATURATION: 98 % | SYSTOLIC BLOOD PRESSURE: 100 MMHG | DIASTOLIC BLOOD PRESSURE: 65 MMHG | TEMPERATURE: 98.6 F

## 2018-08-07 VITALS
TEMPERATURE: 99.32 F | HEART RATE: 74 BPM | DIASTOLIC BLOOD PRESSURE: 74 MMHG | SYSTOLIC BLOOD PRESSURE: 109 MMHG | OXYGEN SATURATION: 98 %

## 2018-08-07 VITALS
DIASTOLIC BLOOD PRESSURE: 77 MMHG | OXYGEN SATURATION: 98 % | SYSTOLIC BLOOD PRESSURE: 114 MMHG | HEART RATE: 69 BPM | TEMPERATURE: 98.24 F

## 2018-08-07 VITALS
HEART RATE: 76 BPM | SYSTOLIC BLOOD PRESSURE: 106 MMHG | OXYGEN SATURATION: 96 % | DIASTOLIC BLOOD PRESSURE: 70 MMHG | TEMPERATURE: 98.78 F

## 2018-08-07 VITALS
HEART RATE: 79 BPM | TEMPERATURE: 98.24 F | DIASTOLIC BLOOD PRESSURE: 81 MMHG | OXYGEN SATURATION: 99 % | SYSTOLIC BLOOD PRESSURE: 122 MMHG

## 2018-08-07 VITALS — TEMPERATURE: 98.6 F

## 2018-08-07 LAB
BASOPHILS # BLD: 0.01 K/UL (ref 0–0.2)
BASOPHILS NFR BLD: 0.2 %
BUN SERPL-MCNC: 58 MG/DL (ref 7–18)
BUN SERPL-MCNC: 65 MG/DL (ref 7–18)
CALCIUM SERPL-MCNC: 8 MG/DL (ref 8.5–10.1)
CALCIUM SERPL-MCNC: 8 MG/DL (ref 8.5–10.1)
CO2 SERPL-SCNC: 18 MMOL/L (ref 21–32)
CO2 SERPL-SCNC: 21 MMOL/L (ref 21–32)
CREAT SERPL-MCNC: 3.5 MG/DL (ref 0.6–1.2)
CREAT SERPL-MCNC: 4.45 MG/DL (ref 0.6–1.2)
EOS ABS #: 0.11 K/UL (ref 0–0.5)
EOSINOPHIL NFR BLD AUTO: 193 K/UL (ref 130–400)
GLUCOSE SERPL-MCNC: 89 MG/DL (ref 70–99)
GLUCOSE SERPL-MCNC: 96 MG/DL (ref 70–99)
HCT VFR BLD CALC: 24 % (ref 37–47)
HGB BLD-MCNC: 7.8 G/DL (ref 12–16)
IG#: 0.02 K/UL (ref 0–0.02)
IMM GRANULOCYTES NFR BLD AUTO: 12.6 %
INR PPP: 0.9 (ref 0.9–1.1)
LYMPHOCYTES # BLD: 0.53 K/UL (ref 1.2–3.4)
MCH RBC QN AUTO: 28.1 PG (ref 25–34)
MCHC RBC AUTO-ENTMCNC: 32.5 G/DL (ref 32–36)
MCV RBC AUTO: 86.3 FL (ref 80–100)
MONO ABS #: 0.34 K/UL (ref 0.11–0.59)
MONOCYTES NFR BLD: 8.1 %
NEUT ABS #: 3.21 K/UL (ref 1.4–6.5)
NEUTROPHILS # BLD AUTO: 2.6 %
NEUTROPHILS NFR BLD AUTO: 76 %
PMV BLD AUTO: 10.4 FL (ref 7.4–10.4)
POTASSIUM SERPL-SCNC: 3.9 MMOL/L (ref 3.5–5.1)
POTASSIUM SERPL-SCNC: 3.9 MMOL/L (ref 3.5–5.1)
RED CELL DISTRIBUTION WIDTH CV: 13.5 % (ref 11.5–14.5)
RED CELL DISTRIBUTION WIDTH SD: 43.3 FL (ref 36.4–46.3)
SODIUM SERPL-SCNC: 138 MMOL/L (ref 136–145)
SODIUM SERPL-SCNC: 139 MMOL/L (ref 136–145)
WBC # BLD AUTO: 4.22 K/UL (ref 4.8–10.8)

## 2018-08-07 RX ADMIN — SODIUM CHLORIDE SCH MLS/HR: 900 INJECTION, SOLUTION INTRAVENOUS at 13:10

## 2018-08-07 RX ADMIN — ATORVASTATIN CALCIUM SCH MG: 10 TABLET, FILM COATED ORAL at 09:22

## 2018-08-07 RX ADMIN — Medication SCH INTER.UNIT: at 09:22

## 2018-08-07 RX ADMIN — PANTOPRAZOLE SODIUM SCH MLS/MIN: 40 INJECTION, POWDER, FOR SOLUTION INTRAVENOUS at 13:11

## 2018-08-07 RX ADMIN — LURASIDONE HYDROCHLORIDE SCH MG: 40 TABLET, FILM COATED ORAL at 09:23

## 2018-08-07 RX ADMIN — HEPARIN SODIUM SCH UNIT: 10000 INJECTION, SOLUTION INTRAVENOUS; SUBCUTANEOUS at 21:13

## 2018-08-07 NOTE — MEDICAL CONSULT
Consultation Note


Date of Service


Aug 7, 2018.





Consultation Note


     Ms Bolanos is a 62 year old woman who is a patient in the [facility] 

Anticoagulation Clinic.  She enrolled in the clinic about 2 years ago and has 

been on a relatively stable (<5% variation in wafarin dosing ) since that time.

  Her INRs have been stable, with a 75% time in therapeutic range.  A singular 

INR greater than 4 was secondary to concomitant antibiotic therapy.  She is 

currently on 50 mg of warfarin weekly. Her last INR in the clinic was 2.5 on 

July 18, 2018.


     I was contacted by the ED at the time of her presentation on 8/6 and 

personally went to the ED to evaluate her.  Of note, her weight was increased 

from her previous AC clinic weight, but more notably her exam was remarkable 

for bruising on her back abdomen and extremities.  She told me that sometime 

during the week of August 1 she began having blood in her urine and her stools.

  She said that she was leaving town to "go for tea with the family" at the end 

of the week, and didn't want to disrupt those plans.  Since she knew her INR 

may be elevated, she stopped taking her coumadin.  By her report, her last dose 

was Thursday (8/2) and she missed doses on Friday, Saturday and Sunday.  She 

came to the ED on Monday. Her INR on arrival to the ED was >10.  A CT of the 

abdomen and pelvis was read as being "consistent with spontaneous bilateral 

renal hemorrhage with extension of blood to the perinephric spaces as well as 

the renal collecting systems and ureters".Her urine was bloody and her stool 

was heme positive.  I recommended that she receive 5,000 units of PCC Kcentra 

to reverse the warfarin and 10 mg IV vitamin K to sustain the effect.  She did 

report some chest discomfort when receiving the IV vitamin K, however an EKG 

was unremarkable and the infusion was able to be completed at a slower rate 

without incident.  Her INR was normalized as of 1902 on 8/6. 


     Based on what I know of the patient and also the metabolism of warfarin, 

there was an obvious change in her coumadin needs/ metabolism in the time 

period between her last INR on 7/18 and her approximate time of bleeding (on or 

around 8/1).  Her INR was greater than 10 despite the fact that she had held 

coumadin for 3 days.  I have never in my career seen such coumadin effect.  I 

interviewed Ms. Bolanos both yesterday and today regarding any potential 

interferences with her INR.  She denies missed or extra doses and reports using 

her pillbox consistently She denies APAP, over the counter medications, changes 

to diet or alcohol, ingestions of any known interfering food sources. These 

interferences would, in my opinion, be insufficient to account for this extreme 

coumadin effect. Of note, she did have a singular change in her medications, an 

increase in her dose of Latuda from 40 mg to 60 mg on 7/31/18.  This was done 

by Dr. Lara, her outpatient psychiatrist, who reports that her family noted 

that her behavior was somewhat atypical at that time.On review of her med 

history through [employer] , it was noted that she had been filling her warfarin 

prescription at the [facility]. Warfarin  had been ordered by her PCP  

by  Zara.  The patient had 180 tabs filled on June 25 (4 months worth) and 

another 180 tabs filled on 7/27.   When questioned, the patient reports 

dropping all of her coumadin into the toilet, which required getting a 

replacement bottle.  


Given the patient's psychiatric illness, the large number of "lost" coumadin 

tablets, the time frame of the events and the highly unusual presentation, I 

cannot rule out that she may have been taking additional doses of warfarin 

during this time.  A warfarin level has been ordered.  I believe (and Dr. Lara agrees) that a psychiatry consultation while in house may be helpful.  

I asked the patient if she would be agreeable and she is.  I have spoken to the 

hospitalist team who will place the consultation.  I will continue to follow 

with you.

## 2018-08-07 NOTE — UROLOGY CONSULTATION
History


General


Date of Service:


Aug 7, 2018.


Chief Complaint:  Hematuria, DAVID


Primary Care Physician:


Dee Dee You MD


Pt seen a urologist before?:  No





History of Present Illness


62 year old female, history of PE - taking Coumadin, with bilateral 

hydronephrosis and debris/clot in both collecting systems and hematuria.


Patient reported to ER with back pain, INR 10, which has been reversed.


CT abdomen & pelvis showing bilateral hydronephrosis and debris/clot in both 

collecting systems.


Patient denies pain this afternoon.


Denies nausea/vomiting. Denies fever/chills.


She is experiencing hematuria.


Denies difficulty passing urine, denies dysuria.


States that she has never seen a urologist before, has had UTI in the past 

managed by PCP.





Imaging


Imaging:  CT


Images were done at:  [facility]





Problem List


Medical Problems:


(1) Abdominal pain


Status: Acute  





(2) Acute kidney injury


Status: Acute  





(3) Anemia


Status: Acute  





(4) Anticoagulated on warfarin


Status: Acute  





(5) Paranoia


Status: Acute  





(6) Precordial chest pain


Status: Acute  





(7) Renal hematuria


Status: Acute  





(8) Schizophrenia


Status: Acute  





(9) UTI (urinary tract infection)


Status: Acute  





(10) UTI (urinary tract infection)


Status: Acute  








Past History


asthma, depression, GERD, pulmonary embolism, urinary tract infection, other (

schizophrenia, chronic anticoagulation, breast cancer)


Past Surgical History:  mastectomy





Family History





Heart disease


Hypertension





Social History


Hx Tobacco Use In Past Year?:  No


Alcohol:  occasional


Drug use:  none


Marital status:  single


Housing status:  lives with family


Occupation status:  retired





Immunizations


History of Influenza Vaccine:  N/A


History of Tetanus Vaccine?:  No


History of Pneumococcal:  Yes


History of Hepatitis B Vaccine:  No





Allergies


Coded Allergies:  


     Celecoxib (Verified  Allergy, Severe, airway edema, 8/6/18)


     Acarides (Mites) (Verified  Allergy, Mild, unknown, 8/6/18)


     Cat Dander (Verified  Allergy, Mild, unknown, 8/6/18)


     Dog Dander (Verified  Allergy, Mild, unknown, 8/6/18)


     Dust (Verified  Allergy, Mild, unknown, 8/6/18)


     Grass (Verified  Allergy, Mild, unknown, 8/6/18)


     Molds & Smuts (Verified  Allergy, Mild, unknown, 8/6/18)


     Clindamycin (Verified  Allergy, Unknown, unknown, 8/6/18)


     Sulfa Antibiotics (Verified  Allergy, Unknown, ., 8/6/18)


     Amoxicillin (Verified  Adverse Reaction, Mild, abdominal pain, 8/6/18)


     Ibuprofen (Verified  Adverse Reaction, Unknown, unknown, 8/6/18)


     Rofecoxib (Verified  Adverse Reaction, Unknown, unknown, 8/6/18)





Medications


Home Medications:





Home Meds and Scripts








 Medications  Dose


 Route/Sig


 Max Daily Dose Days Date Category Dose


Instructions


 


 Jantoven


  (Warfarin Sodium)


 7.5 Mg Tab  7.5 Mg


 PO 6XWK


    8/6/18 Reported  MON, TUES, WED, THURS, FRI, SAT


 


 Latuda


  (Lurasidone Hcl)


 60 Mg Tab  60 Mg


 PO DAILY


    8/6/18 Reported 


 


 Flonase Allergy


 Relief


  (Fluticasone


 Propionate


  (Nasal)) 50


 Mcg/Act Spr  2 Sprays


 KATIA QAM PRN


    6/6/18 Reported 


 


 Asmanex


 Twisthaler 120 Me


  (Mometasone


 Furoate


  (Inhalation) 220


 Mcg/Inh Aer  1 Puff


 INH DAILY PRN


    6/6/18 Reported 


 


 Ventolin Hfa


  (Albuterol) 200


 Puffs/96092 Mcg


 Aers  2 Puffs


 INH Q6H PRN


    6/6/18 Reported 


 


 Vitamin D3


  (Cholecalciferol)


 1,000 Unit Tab  1 Tab


 PO DAILY


   30 4/14/17 Reported 


 


 Warfarin Sodium 5


 Mg Tab  5 Mg


 PO WK


    5/11/16 Reported  SUNDAYS


 


 Lipitor


  (Atorvastatin


 Calcium) 20 Mg Tab  10 Mg


 PO DAILY


    5/11/16 Reported 


 


 Loratadine-D 12HR


  (Loratadine &


 Pseudoephedrine)


 1 Tab Tab  10 Mg


 PO QAM PRN


    5/19/15 Reported 


 


 Aciphex


  (Rabeprazole


 Sodium) 20 Mg


 Tabcr  20 Mg


 PO QAM PRN


    1/28/15 Reported 


 


 Systane


  (Polyethylene


 Glycol-Propylene)


 1 Sol Sol  2 Drops


 OPB BID PRN


    1/28/15 Reported 








Inpatient Medications:





Current Inpatient Medications








 Medications


  (Trade)  Dose


 Ordered  Sig/Angely


 Route  Start Time


 Stop Time Status Last Admin


Dose Admin


 


 Ioversol


  (Optiray 320)  100 ml  UD  PRN


 IV  8/6/18 13:15


 8/10/18 13:14   


 


 


 Albuterol


  (Ventolin Hfa


 Inhaler)  2 puffs  Q6H  PRN


 INH  8/6/18 17:45


 9/5/18 17:44   


 


 


 Atorvastatin


 Calcium


  (Lipitor Tab)  10 mg  DAILY


 PO  8/7/18 09:00


 9/6/18 08:59  8/7/18 09:22


10 MG


 


 Cholecalciferol


  (Vitamin D Tab)  1,000


 inter.unit  DAILY


 PO  8/7/18 09:00


 9/6/18 08:59  8/7/18 09:22


1,000 INTER.UNIT


 


 Lurasidone HCl


  (Latuda Tab)  60 mg  DAILY


 PO  8/7/18 09:00


 9/6/18 08:59  8/7/18 09:23


60 MG


 


 Sodium Chloride  1,000 ml @ 


 125 mls/hr  Q8H


 IV  8/6/18 21:00


 8/7/18 19:19  8/7/18 13:10


80 MLS/HR


 


 Pantoprazole


 Sodium 40 mg/


 Syringe  10 ml @ 5


 mls/min  DAILY@11


 IV  8/7/18 11:00


 9/6/18 10:59  8/7/18 13:11


5 MLS/MIN


 


 Heparin Sodium


  (Porcine)


  (Heparin Sq 5000


 Unit/0.5ml)  5,000 unit  Q12


 SQ  8/7/18 21:00


 9/6/18 20:59 UNV  


 











Review of Systems


Review of Systems


Constitutional:  No fever, No chills


Eyes:  No blurred vision


Neurological:  No dizzy, No numbness/tingling


Gastrointestinal:  No abdominal pain, No nausea, No vomiting


Cardiovascular:  No chest pain


Respiratory:  No shortness of breath


Skin:  No rash


Blood / Lymphatic:  + see HPI


Psychologic / Mental:  + see HPI


Female :  + blood in urine, + infections, No frequent urination, No painful 

urination, No urinary retention, No weak stream





Physical Exam


Vital Signs:





Vital Signs Past 12 Hours








  Date Time  Temp Pulse Resp B/P (MAP) Pulse Ox O2 Delivery O2 Flow Rate FiO2


 


8/7/18 11:30 36.8 69 18 114/77 (89) 98   


 


8/7/18 08:00      Room Air  


 


8/7/18 06:33 37.0 70 18 100/65 (77) 98 Room Air  


 


8/7/18 03:06 37.1 76 18 106/70 (82) 96 Room Air  








Physical Exam:


General Appearance:  no apparent distress


Eyes:  bilateral eyes normal inspection


ENT:  hearing grossly normal


Neck:  supple, no JVD


Respiratory/Chest:  no respiratory distress, no accessory muscle use


Cardiovascular:  no JVD


Gastrointestinal:  


   Abdomen:  normal abdomen


   Bladder:  normal bladder


Extremities:  normal inspection


Neurologic/Psychiatric:  alert, normal mood/affect, oriented x 3


Skin:  normal color





Assessment & Plan


Assessment & Plan


62 year old female, history of PE - taking Coumadin, with bilateral 

hydronephrosis and debris/clot in both collecting systems and hematuria.


Case discussed with Lauri Quintana and Laurence, as well as Nephrology.


Creatinine slowly coming down. Will continue to monitor.


Patient appears well, clinically. Pain is controlled and no urinary 

difficulties.


UC&S pending.


At this time will try to avoid placing stent with active bleeding as it may 

cause further ureteral obstruction.


Bladder scan PRN if patient reports voiding difficulty.


Will plan to reevaluate  imaging after INR normalizes and bleeding has 

improved to evaluate for any  cause.


Thank you for the consult, will continue to follow along with primary service.

## 2018-08-07 NOTE — FAMILY MEDICINE PROGRESS NOTE
Progress Note


Date of Service


Aug 7, 2018.





Subjective


Pt evaluation today including:  conversation w/ patient, conversation w/ family

, physical exam, chart review, lab review


Pain:  joint and back pain reported to be getting better


PO Intake:  poor


Ms Tamara Bolanos is a 62 year old woman with a past medical history significant for 

Pulmonary Embolisms requiring lifetime warfarin therapy who presented to the 

emergency room with an unmeasurably high supratherapeutic INR. She reports a 1 

week history of immanuel hematuria, 3 episodes of melena, and significant bruising 

and joint pain. On friday she realized her symptoms were probably due to 

warfarin overdose and stopped taking her medication. According to the patient 

her last dose was on Thursday of last week. 





When she presented her INR was greater than 10 and this was corrected by 

vitamin K and four factor PCC administration. She says she feels much better 

today and had one episode of urination this morning that she says did not 

appear to have any blood in it.





   Constitutional:  No fever, No chills, No sweats, No weight loss, No weakness

, No fatigue


   ENT:  + sore throat, No hearing loss, No unusual epistaxis


   Respiratory:  + shortness of breath, No cough, No sputum, No hemoptysis


   Cardiovascular:  No chest pain, No edema, No palpitations


   Abdomen:  + problem reported (Lack of Appetite), No vomiting, No diarrhea, 

No constipation


   Musculoskeletal:  + joint pain, + muscle pain, No calf pain


   Female :  + hematuria, No abnormal vaginal bleeding


   Neurologic:  No weakness, No numbness/tingling, No balance problems


   Heme:  + abnormal bleeding/bruising, + clotting problems





Medications





Current Inpatient Medications








 Medications


  (Trade)  Dose


 Ordered  Sig/Angely


 Route  Start Time


 Stop Time Status Last Admin


Dose Admin


 


 Ioversol


  (Optiray 320)  100 ml  UD  PRN


 IV  8/6/18 13:15


 8/10/18 13:14   


 


 


 Albuterol


  (Ventolin Hfa


 Inhaler)  2 puffs  Q6H  PRN


 INH  8/6/18 17:45


 9/5/18 17:44   


 


 


 Atorvastatin


 Calcium


  (Lipitor Tab)  10 mg  DAILY


 PO  8/7/18 09:00


 9/6/18 08:59  8/7/18 09:22


10 MG


 


 Cholecalciferol


  (Vitamin D Tab)  1,000


 inter.unit  DAILY


 PO  8/7/18 09:00


 9/6/18 08:59  8/7/18 09:22


1,000 INTER.UNIT


 


 Lurasidone HCl


  (Latuda Tab)  60 mg  DAILY


 PO  8/7/18 09:00


 9/6/18 08:59  8/7/18 09:23


60 MG


 


 Sodium Chloride  1,000 ml @ 


 125 mls/hr  Q8H


 IV  8/6/18 21:00


 8/7/18 19:19  8/7/18 13:10


80 MLS/HR


 


 Pantoprazole


 Sodium 40 mg/


 Syringe  10 ml @ 5


 mls/min  DAILY@11


 IV  8/7/18 11:00


 9/6/18 10:59  8/7/18 13:11


5 MLS/MIN


 


 Heparin Sodium


  (Porcine)


  (Heparin Sq 5000


 Unit/0.5ml)  5,000 unit  Q12


 SQ  8/7/18 21:00


 9/6/18 20:59   


 











Objective


Vital Signs











  Date Time  Temp Pulse Resp B/P (MAP) Pulse Ox O2 Delivery O2 Flow Rate FiO2


 


8/7/18 15:09 36.8 79 18 122/81 (95) 99 Room Air  


 


8/7/18 11:30 36.8 69 18 114/77 (89) 98   


 


8/7/18 08:00      Room Air  


 


8/7/18 06:33 37.0 70 18 100/65 (77) 98 Room Air  


 


8/7/18 03:06 37.1 76 18 106/70 (82) 96 Room Air  


 


8/7/18 00:59 37.0       


 


8/6/18 23:17 38.1 79 18 119/75 (90) 100 Room Air  


 


8/6/18 20:50     99 Room Air  


 


8/6/18 20:08 37.4 80 18 143/84 99 Room Air  


 


8/6/18 18:45 37.4 80 18 143/84 (103) 99 Room Air  


 


8/6/18 18:27  70 18 156/76 99   











Physical Exam


General Appearance:  WD/WN, no apparent distress


Respiratory/Chest:  chest non-tender, lungs clear, normal breath sounds, no 

respiratory distress, no accessory muscle use


Cardiovascular:  regular rate, rhythm, no gallop


Abdomen:  normal bowel sounds, non tender, soft


Extremities:  no calf tenderness, + pertinent finding (Extensive bruising to 

all extremities)


Neurologic/Psychiatric:  no motor/sensory deficits, alert, oriented x 3, + 

pertinent finding (Flat affect)





Laboratory Results


8/7/18 06:49








Red Blood Count 2.78, Mean Corpuscular Volume 86.3, Mean Corpuscular Hemoglobin 

28.1, Mean Corpuscular Hemoglobin Concent 32.5, Mean Platelet Volume 10.4, 

Neutrophils (%) (Auto) 76.0, Lymphocytes (%) (Auto) 12.6, Monocytes (%) (Auto) 

8.1, Eosinophils (%) (Auto) 2.6, Basophils (%) (Auto) 0.2, Neutrophils # (Auto) 

3.21, Lymphocytes # (Auto) 0.53, Monocytes # (Auto) 0.34, Eosinophils # (Auto) 

0.11, Basophils # (Auto) 0.01





8/7/18 15:07








8/7/18 15:07

















Test


  8/7/18


06:49 8/7/18


15:07


 


White Blood Count


  4.22 K/uL


(4.8-10.8) 


 


 


Red Blood Count


  2.78 M/uL


(4.2-5.4) 


 


 


Hemoglobin


  7.8 g/dL


(12.0-16.0) 


 


 


Hematocrit 24.0 % (37-47)  


 


Mean Corpuscular Volume


  86.3 fL


() 


 


 


Mean Corpuscular Hemoglobin


  28.1 pg


(25-34) 


 


 


Mean Corpuscular Hemoglobin


Concent 32.5 g/dl


(32-36) 


 


 


Platelet Count


  193 K/uL


(130-400) 


 


 


Mean Platelet Volume


  10.4 fL


(7.4-10.4) 


 


 


Neutrophils (%) (Auto) 76.0 %  


 


Lymphocytes (%) (Auto) 12.6 %  


 


Monocytes (%) (Auto) 8.1 %  


 


Eosinophils (%) (Auto) 2.6 %  


 


Basophils (%) (Auto) 0.2 %  


 


Neutrophils # (Auto)


  3.21 K/uL


(1.4-6.5) 


 


 


Lymphocytes # (Auto)


  0.53 K/uL


(1.2-3.4) 


 


 


Monocytes # (Auto)


  0.34 K/uL


(0.11-0.59) 


 


 


Eosinophils # (Auto)


  0.11 K/uL


(0-0.5) 


 


 


Basophils # (Auto)


  0.01 K/uL


(0-0.2) 


 


 


RDW Standard Deviation


  43.3 fL


(36.4-46.3) 


 


 


RDW Coefficient of Variation


  13.5 %


(11.5-14.5) 


 


 


Immature Granulocyte % (Auto) 0.5 %  


 


Immature Granulocyte # (Auto)


  0.02 K/uL


(0.00-0.02) 


 


 


Red Blood Cell Morphology Unremarkable  


 


Prothrombin Time


  9.9 SECONDS


(9.0-12.0) 


 


 


Prothromb Time International


Ratio 0.9 (0.9-1.1) 


  


 


 


Anion Gap


  


  7.0 mmol/L


(3-11)


 


Est Creatinine Clear Calc


Drug Dose 


  18.3 ml/min 


 


 


Estimated GFR (


American) 


  15.2 


 


 


Estimated GFR (Non-


American 


  13.1 


 


 


BUN/Creatinine Ratio  16.7 (10-20) 


 


Calcium Level


  


  8.0 mg/dl


(8.5-10.1)











Assessment and Plan


Supratherapeutic INR


-Corrected with PCC four factor and vitamin K


-1.0 today





DAVID 2/2 Renal hemorrhage


-Labs trening down. 


-Creatinine at 4.45 down from 5.49





Acute blood loss anemia


-Hemoglobin of 7.8 today


-Bleeding appears halted, will wait and see with Hemoglobin q12h





Asthma


-Stable continue to monitor.





Resident Physician Supervision Note:





I interviewed and examined the patient. Discussed with Dr. Douglas and agree 

with findings and plan as documented in the note. Any exceptions or 

clarifications are listed here: None





Documented By:  Jaskaran Mirza


feeling better overall


no new complaints


d/w dr gutierrez - input greatly appreciated


vitals noted nad breathing unlabored no pallor or icterus





supratherapeutic INR - ?accidental ingestion vs intentional vs psychogenic vs 

high INR from poor vitamin K intake from poor PO intake (least likely)


-w/u underway


-coumadin reversed


-will need to consider all options for long term anticoagulation w VTE hx





ARF


-appearing mostly obstructive from hydronephrosis/hydroureter due to debris 

from renal bleeding - hopefully self-resolves.  urology on consult for stenting 

if it does not.


-follow, gentle hydration 


-may also be a component of dehydration and/or prerenal from blood loss and 

poor PO intake


-outside concern on actual intrinsic renal as relates to hematuria, but follow 

closely for now as no clear evidence this is going on





hematuria


-?spontaneous renal hemorrhage simply from markedly elevated INR vs structural 

renal lesion vs intrinsic renal disease


-follow closely


-mostly will need w/u once hemorrhage has resolved enough to "clear distractors

" from clinical picture as relates to structural vs intrinsic renal --> if no 

structural lesions noted and blood totally clears, then by process of 

elimination spontaneous bleed would be most likely.  would still want to follow 

UA periodically over time if this is the case, though





acute blood loss anemia


-see above.  follow.  appears to be stabilizing.  no indications for 

transfusion at this time.  type/screen done





GI blood loss


-from high INR.  will require GI w/u for completeness.  no urgency in this 

regard since no overt GI hemorrhage





depression/schizophrenia


-w ~360 coumadin tabs filled in about 5wks, concerning


-follow closely and try to glean ?accidental vs intentional vs psychosis driven


-psych consult





prior VTE/DVT proph


-dangerous risk/benefit balance in all regards -- prior multiple DVTs and inpt/

volume depleted/etc - so risk of recurrence is not low; bleeding due to high INR

-- delicate balance.  mechanical prophylaxis of dubious benefit.  since bleed 

appears to have been due to high INR and has stopped - probably best risk/

benefit balance is low dose SQ heparin (BID --> lower dosing, heparin since 

short half life and reversible) -- continue to follow closely


-will also need to work with dr gutierrez for best long term plan once full 

anticoagulation can be resumed.





Resident Tracking


Resident Involvement:  Resident Care Provided


Care Provided:  Adult Hospital Medicine

## 2018-08-07 NOTE — NEPHROLOGY CONSULTATION
Nephrology Consultation


Date & Providers





Date of Consultation:  Aug 7, 2018.





Primary Care Provider:  Dee Dee You MD





Referring Provider:





Reason for Consultation


DAVID, gross hematuria





History of Present Illness


Miss Bolanos is a 62 year old white female who is seen at the request of Dr. Pinedo for evaluation of DAVID and gross hematuria.  Medical records in the EMR 

were reviewed and are summarized as follows:  Miss Bolanos has a h/o schizophrenia

, asthma, GERD, breast CA (s/p bilateral mastectomy/chemo/xrt), and PE.  She 

has been on chronic anticoagulation therapy w/ Warfarin since 2009.  She 

reports that her INR has been easily maintained within the 2 - 3 target range.  

Recently Miss Bolanos developed abdominal discomfort, melena and abdominal 

bruising.  She stopped her Warfarin 3 days ago but presented to the ED due to 

progressive abdominal pain.  In the ED INR was > 10 and creatinine was noted to 

have risen from baseline 0.9 to 5.49.  Patient was managed w/ Vitamin K and IV 

hydration.  Abdominal CT revealed bilateral hydronephrosis with debris / clot 

within both collecting systems.





Past Medical/Surgical History


Medical:


#  Breast CA (s/p bilateral mastectomy, chemo & xrt)


#  PE on anticoagulation therapy


#  Asthma


#  GERD


#  Schizophrenia


#  Depression


#  Lymphedema








Surgical:


#  Bilateral mastectomy


#  SHAMEKA-BSO


#  Cataract surgery


#  Bladder repair











Allergies


Coded Allergies:  


     Celecoxib (Verified  Allergy, Severe, airway edema, 8/6/18)


     Acarides (Mites) (Verified  Allergy, Mild, unknown, 8/6/18)


     Cat Dander (Verified  Allergy, Mild, unknown, 8/6/18)


     Dog Dander (Verified  Allergy, Mild, unknown, 8/6/18)


     Dust (Verified  Allergy, Mild, unknown, 8/6/18)


     Grass (Verified  Allergy, Mild, unknown, 8/6/18)


     Molds & Smuts (Verified  Allergy, Mild, unknown, 8/6/18)


     Clindamycin (Verified  Allergy, Unknown, unknown, 8/6/18)


     Sulfa Antibiotics (Verified  Allergy, Unknown, ., 8/6/18)


     Amoxicillin (Verified  Adverse Reaction, Mild, abdominal pain, 8/6/18)


     Ibuprofen (Verified  Adverse Reaction, Unknown, unknown, 8/6/18)


     Rofecoxib (Verified  Adverse Reaction, Unknown, unknown, 8/6/18)





Inpatient Medications





Current Inpatient Medications








 Medications


  (Trade)  Dose


 Ordered  Sig/Angely


 Route  Start Time


 Stop Time Status Last Admin


Dose Admin


 


 Ioversol


  (Optiray 320)  100 ml  UD  PRN


 IV  8/6/18 13:15


 8/10/18 13:14   


 


 


 Albuterol


  (Ventolin Hfa


 Inhaler)  2 puffs  Q6H  PRN


 INH  8/6/18 17:45


 9/5/18 17:44   


 


 


 Atorvastatin


 Calcium


  (Lipitor Tab)  10 mg  DAILY


 PO  8/7/18 09:00


 9/6/18 08:59  8/7/18 09:22


10 MG


 


 Cholecalciferol


  (Vitamin D Tab)  1,000


 inter.unit  DAILY


 PO  8/7/18 09:00


 9/6/18 08:59  8/7/18 09:22


1,000 INTER.UNIT


 


 Lurasidone HCl


  (Latuda Tab)  60 mg  DAILY


 PO  8/7/18 09:00


 9/6/18 08:59  8/7/18 09:23


60 MG


 


 Sodium Chloride  1,000 ml @ 


 80 mls/hr  [id number]


 IV  8/6/18 21:00


 8/7/18 21:59  8/6/18 21:00


80 MLS/HR


 


 Pantoprazole


 Sodium 40 mg/


 Syringe  10 ml @ 5


 mls/min  DAILY@11


 IV  8/7/18 11:00


 9/6/18 10:59   


 











Family History





Heart disease


Hypertension


Negative for CKD / ESRD





Social History


Smoking Status:  Never Smoker


Smokeless Tobacco Use:  No


Drug Use:  none


Marital Status:  single


Housing Status:  lives with family


Occupation:  retired


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





Review of Systems


Constitutional:  No fever


Respiratory:  No shortness of breath


Cardiovascular:  No chest pain


Abdomen:  No pain, No nausea, No vomiting


Genitourinary - Female:  + hematuria


A complete review of systems was performed.  Pertinent positives are noted 

above. All other systems are negative.





Physical Exam











  Date Time  Temp Pulse Resp B/P (MAP) Pulse Ox O2 Delivery O2 Flow Rate FiO2


 


8/7/18 06:33 37.0 70 18 100/65 (77) 98 Room Air  


 


8/7/18 03:06 37.1 76 18 106/70 (82) 96 Room Air  


 


8/7/18 00:59 37.0       


 


8/6/18 23:17 38.1 79 18 119/75 (90) 100 Room Air  


 


8/6/18 20:50     99 Room Air  


 


8/6/18 20:08 37.4 80 18 143/84 99 Room Air  


 


8/6/18 18:45 37.4 80 18 143/84 (103) 99 Room Air  


 


8/6/18 18:27  70 18 156/76 99   


 


8/6/18 17:00  72 16 145/103 98 Room Air  


 


8/6/18 15:01  75 16 134/76 97 Room Air  


 


8/6/18 14:01    135/91    


 


8/6/18 13:53  76      


 


8/6/18 13:41  76 19  99   


 


8/6/18 13:33    120/77    


 


8/6/18 13:07     96 Room Air  


 


8/6/18 12:08 37.3 86 18 118/81 96 Room Air  








General Appearance:  no apparent distress


Head:  normocephalic, atraumatic


Eyes:  PERRL, EOMI


Neck:  no adenopathy


Respiratory/Chest:  lungs clear, no respiratory distress


Cardiovascular:  regular rate, rhythm


Abdomen/GI:  normal bowel sounds, soft, + pertinent finding (mildly tender to 

palpation.  Ecchymosis on abdomen, flanks and back)


Extremities/Musculoskelatal:  no calf tenderness, no pedal edema


Neurologic/Psych:  alert, oriented x 3





Laboratory Results





Last 24 Hours








Test


  8/6/18


13:14 8/6/18


13:15 8/6/18


18:07 8/6/18


19:02


 


White Blood Count 6.41 K/uL    


 


Red Blood Count 3.50 M/uL    


 


Hemoglobin 10.1 g/dL    


 


Hematocrit 30.0 %    


 


Mean Corpuscular Volume 85.7 fL    


 


Mean Corpuscular Hemoglobin 28.9 pg    


 


Mean Corpuscular Hemoglobin


Concent 33.7 g/dl 


  


  


  


 


 


Platelet Count 212 K/uL    


 


Mean Platelet Volume 10.7 fL    


 


Neutrophils (%) (Auto) 86.0 %    


 


Lymphocytes (%) (Auto) 8.4 %    


 


Monocytes (%) (Auto) 4.8 %    


 


Eosinophils (%) (Auto) 0.3 %    


 


Basophils (%) (Auto) 0.2 %    


 


Neutrophils # (Auto) 5.51 K/uL    


 


Lymphocytes # (Auto) 0.54 K/uL    


 


Monocytes # (Auto) 0.31 K/uL    


 


Eosinophils # (Auto) 0.02 K/uL    


 


Basophils # (Auto) 0.01 K/uL    


 


RDW Standard Deviation 43.4 fL    


 


RDW Coefficient of Variation 13.8 %    


 


Immature Granulocyte % (Auto) 0.3 %    


 


Immature Granulocyte # (Auto) 0.02 K/uL    


 


Prothrombin Time


  > 100.0


SECONDS 


  


  10.2 SECONDS 


 


 


Prothromb Time International


Ratio > 10.0 


  


  


  1.0 


 


 


Activated Partial


Thromboplast Time 182.3 SECONDS 


  


  


  


 


 


Partial Thromboplastin Ratio 6.9    


 


Sodium Level 136 mmol/L    


 


Potassium Level 4.1 mmol/L    


 


Chloride Level 103 mmol/L    


 


Carbon Dioxide Level 23 mmol/L    


 


Anion Gap 10.0 mmol/L    


 


Blood Urea Nitrogen 65 mg/dl    


 


Creatinine 5.49 mg/dl    


 


Est Creatinine Clear Calc


Drug Dose 12.2 ml/min 


  


  


  


 


 


Estimated GFR (


American) 8.8 


  


  


  


 


 


Estimated GFR (Non-


American 7.6 


  


  


  


 


 


BUN/Creatinine Ratio 11.7    


 


Random Glucose 109 mg/dl    


 


Calcium Level 9.1 mg/dl    


 


Magnesium Level 2.3 mg/dl    


 


Total Bilirubin 0.8 mg/dl    


 


Aspartate Amino Transf


(AST/SGOT) 20 U/L 


  


  


  


 


 


Alanine Aminotransferase


(ALT/SGPT) 17 U/L 


  


  


  


 


 


Alkaline Phosphatase 65 U/L    


 


Pro-B-Type Natriuretic Peptide 1262 pg/ml    


 


Total Protein 6.6 gm/dl    


 


Albumin 2.9 gm/dl    


 


Globulin 3.7 gm/dl    


 


Albumin/Globulin Ratio 0.8    


 


Urine Color  RED   


 


Urine Appearance  TURBID   


 


Urine pH     


 


Urine Specific Gravity  1.016   


 


Urine Protein     


 


Urine Glucose (UA)     


 


Urine Ketones     


 


Urine Occult Blood     


 


Urine Nitrite     


 


Urine Bilirubin     


 


Urine Urobilinogen     


 


Urine Leukocyte Esterase     


 


Urine RBC  >30 /hpf   


 


Urine WBC  >30 /hpf   


 


Urine Epithelial Cells  20-30 /lpf   


 


Urine Bacteria  1+   


 


Urine Opiates Screen  NEG   


 


Urine Methadone, Qualitative  NEG   


 


Urine Barbiturates  NEG   


 


Urine Phencyclidine (PCP)


Level 


  NEG 


  


  


 


 


Ur


Amphetamine/Methamphetamine 


  NEG 


  


  


 


 


MDMA (Ecstasy) Screen  NEG   


 


Urine Benzodiazepines Screen  NEG   


 


Urine Cocaine Metabolite  NEG   


 


Urine Marijuana (THC)  NEG   


 


Test


  8/7/18


06:49 


  


  


 


 


White Blood Count 4.22 K/uL    


 


Red Blood Count 2.78 M/uL    


 


Hemoglobin 7.8 g/dL    


 


Hematocrit 24.0 %    


 


Mean Corpuscular Volume 86.3 fL    


 


Mean Corpuscular Hemoglobin 28.1 pg    


 


Mean Corpuscular Hemoglobin


Concent 32.5 g/dl 


  


  


  


 


 


Platelet Count 193 K/uL    


 


Mean Platelet Volume 10.4 fL    


 


Neutrophils (%) (Auto) 76.0 %    


 


Lymphocytes (%) (Auto) 12.6 %    


 


Monocytes (%) (Auto) 8.1 %    


 


Eosinophils (%) (Auto) 2.6 %    


 


Basophils (%) (Auto) 0.2 %    


 


Neutrophils # (Auto) 3.21 K/uL    


 


Lymphocytes # (Auto) 0.53 K/uL    


 


Monocytes # (Auto) 0.34 K/uL    


 


Eosinophils # (Auto) 0.11 K/uL    


 


Basophils # (Auto) 0.01 K/uL    


 


RDW Standard Deviation 43.3 fL    


 


RDW Coefficient of Variation 13.5 %    


 


Immature Granulocyte % (Auto) 0.5 %    


 


Immature Granulocyte # (Auto) 0.02 K/uL    


 


Red Blood Cell Morphology Unremarkable    


 


Prothrombin Time 9.9 SECONDS    


 


Prothromb Time International


Ratio 0.9 


  


  


  


 


 


Sodium Level 138 mmol/L    


 


Potassium Level 3.9 mmol/L    


 


Chloride Level 110 mmol/L    


 


Carbon Dioxide Level 18 mmol/L    


 


Anion Gap 10.0 mmol/L    


 


Blood Urea Nitrogen 65 mg/dl    


 


Creatinine 4.45 mg/dl    


 


Est Creatinine Clear Calc


Drug Dose 14.4 ml/min 


  


  


  


 


 


Estimated GFR (


American) 11.3 


  


  


  


 


 


Estimated GFR (Non-


American 9.8 


  


  


  


 


 


BUN/Creatinine Ratio 14.6    


 


Random Glucose 89 mg/dl    


 


Calcium Level 8.0 mg/dl    











Impression





(1) Acute kidney injury


(2) Renal hemorrhage, left


(3) Renal hemorrhage, right


(4) Elevated INR


(5) Breast CA


(6) Pulmonary embolism


(7) Schizophrenia





Recommendations


Patient has obstructive uropathy related to bilateral renal hemorrhage 

associated w/ supratherapeutic INR.  Abdomial CT films reviewed today show 

bilateral hydronephrosis w/ debris / clot within both collecting systems.  INR 

has been corrected.  Creatinine is mildly improved from 5.5 to 4.5 (baseline 0.9

).  Patient is oliguric with only 450 cc UO overnight.  Volume status and 

electrolyte balance are acceptable at this time.  Will ask Urology to follow 

along in case ureteral stents are needed.  Will repeat PRP this afternoon and 

again in am.  I have advised patient to collect all UO for measurement.  Once 

renal function has recovered may need to repeat imaging to ensure there is no 

underlying structural cause for bilateral renal hemorrhage.

## 2018-08-08 VITALS
DIASTOLIC BLOOD PRESSURE: 69 MMHG | OXYGEN SATURATION: 100 % | TEMPERATURE: 98.06 F | HEART RATE: 75 BPM | SYSTOLIC BLOOD PRESSURE: 103 MMHG

## 2018-08-08 VITALS
HEART RATE: 73 BPM | TEMPERATURE: 99.32 F | SYSTOLIC BLOOD PRESSURE: 116 MMHG | OXYGEN SATURATION: 97 % | DIASTOLIC BLOOD PRESSURE: 69 MMHG

## 2018-08-08 VITALS
SYSTOLIC BLOOD PRESSURE: 125 MMHG | HEART RATE: 84 BPM | OXYGEN SATURATION: 100 % | DIASTOLIC BLOOD PRESSURE: 76 MMHG | TEMPERATURE: 98.42 F

## 2018-08-08 VITALS
TEMPERATURE: 98.78 F | OXYGEN SATURATION: 99 % | SYSTOLIC BLOOD PRESSURE: 107 MMHG | DIASTOLIC BLOOD PRESSURE: 69 MMHG | HEART RATE: 78 BPM

## 2018-08-08 VITALS
DIASTOLIC BLOOD PRESSURE: 76 MMHG | OXYGEN SATURATION: 100 % | SYSTOLIC BLOOD PRESSURE: 125 MMHG | TEMPERATURE: 98.42 F | HEART RATE: 84 BPM

## 2018-08-08 LAB
BASOPHILS # BLD: 0.02 K/UL (ref 0–0.2)
BASOPHILS NFR BLD: 0.5 %
BUN SERPL-MCNC: 46 MG/DL (ref 7–18)
CALCIUM SERPL-MCNC: 8.5 MG/DL (ref 8.5–10.1)
CO2 SERPL-SCNC: 23 MMOL/L (ref 21–32)
CREAT SERPL-MCNC: 2.28 MG/DL (ref 0.6–1.2)
EOS ABS #: 0.24 K/UL (ref 0–0.5)
EOSINOPHIL NFR BLD AUTO: 227 K/UL (ref 130–400)
GLUCOSE SERPL-MCNC: 87 MG/DL (ref 70–99)
HCT VFR BLD CALC: 24 % (ref 37–47)
HGB BLD-MCNC: 8.1 G/DL (ref 12–16)
IG#: 0.02 K/UL (ref 0–0.02)
IMM GRANULOCYTES NFR BLD AUTO: 20.2 %
INR PPP: 1.1 (ref 0.9–1.1)
LYMPHOCYTES # BLD: 0.89 K/UL (ref 1.2–3.4)
MCH RBC QN AUTO: 29.5 PG (ref 25–34)
MCHC RBC AUTO-ENTMCNC: 33.8 G/DL (ref 32–36)
MCV RBC AUTO: 87.3 FL (ref 80–100)
MONO ABS #: 0.37 K/UL (ref 0.11–0.59)
MONOCYTES NFR BLD: 8.4 %
NEUT ABS #: 2.87 K/UL (ref 1.4–6.5)
NEUTROPHILS # BLD AUTO: 5.4 %
NEUTROPHILS NFR BLD AUTO: 65 %
PMV BLD AUTO: 10.4 FL (ref 7.4–10.4)
POTASSIUM SERPL-SCNC: 3.6 MMOL/L (ref 3.5–5.1)
RED CELL DISTRIBUTION WIDTH CV: 13.9 % (ref 11.5–14.5)
RED CELL DISTRIBUTION WIDTH SD: 44.7 FL (ref 36.4–46.3)
SODIUM SERPL-SCNC: 142 MMOL/L (ref 136–145)
WBC # BLD AUTO: 4.41 K/UL (ref 4.8–10.8)

## 2018-08-08 RX ADMIN — PANTOPRAZOLE SODIUM SCH MLS/MIN: 40 INJECTION, POWDER, FOR SOLUTION INTRAVENOUS at 11:18

## 2018-08-08 RX ADMIN — POTASSIUM CHLORIDE AND SODIUM CHLORIDE SCH MLS/HR: 450; 150 INJECTION, SOLUTION INTRAVENOUS at 12:06

## 2018-08-08 RX ADMIN — Medication SCH INTER.UNIT: at 07:42

## 2018-08-08 RX ADMIN — POTASSIUM CHLORIDE AND SODIUM CHLORIDE SCH MLS/HR: 450; 150 INJECTION, SOLUTION INTRAVENOUS at 20:33

## 2018-08-08 RX ADMIN — HEPARIN SODIUM SCH UNIT: 10000 INJECTION, SOLUTION INTRAVENOUS; SUBCUTANEOUS at 07:44

## 2018-08-08 RX ADMIN — HEPARIN SODIUM SCH UNIT: 10000 INJECTION, SOLUTION INTRAVENOUS; SUBCUTANEOUS at 20:24

## 2018-08-08 RX ADMIN — LURASIDONE HYDROCHLORIDE SCH MG: 40 TABLET, FILM COATED ORAL at 07:42

## 2018-08-08 RX ADMIN — ATORVASTATIN CALCIUM SCH MG: 10 TABLET, FILM COATED ORAL at 07:42

## 2018-08-08 NOTE — PSYCHIATRIC CONSULTATION
Consultation


Date of Consultation


Aug 8, 2018.





Identifying Data


62-year-old single female with a history of delusional disorder and anxiety not 

otherwise specified who is admitted medically with supratherapeutic INR and 

spontaneous bleeding.  Psychiatry is consulted to evaluate the context of 

warfarin overdose.





Chief Complaint


"They think I got my medicine mixed up ".





History of Present Illness


Patient is known to me from one previous psychiatric hospitalization on our 

unit in January 2015.  She was hospitalized involuntarily, and placed on a 303 

commitment.  She had no psychiatric history prior to that, and presented with 

paranoia and delusions.  She was diagnosed with delusional disorder and started 

on risperidone, and sertraline was started for anxiety not otherwise specified.

  She also endorsed hoarding behaviors.  She was referred for outpatient follow-

up with Dr. Lara.  She presented to the ER several days ago with 1 week of 

low energy, hematuria, melena, easy bruising, and pain.  Her INR was >10, and 

her last INR 2 weeks prior was 2.4.  She suffered acute kidney injury, due to 

spontaneous renal hemorrhage.  She has had nephrology, urology, and hematology 

consultations.  Dr. Delcid, who follows her at the [facility]

, saw her and noted that her last INR was on 7/18/2018, and have been stable.  

The patient did not endorse any changes in her over-the-counter medications, 

diet, or other ingestions that could account for the drastic change in her 

Coumadin needs or metabolism.  Her outpatient prescription history was reviewed 

as well, and it was noted that she had filled 4 months worth of Coumadin on 6/25 /2018, and another 4 months worth just 1 month later on 7/27/2018.  She stated 

that she had dropped her Coumadin into the toilet, and had to get a replacement 

prescription.  She reported to multiple staff that she had been compliant with 

her Coumadin dosing, but had not taken it for 3 days prior to presentation due 

to new onset bleeding and bruising.  She said she did not immediately seek 

medical attention because she was going out of town with family for the weekend

, but did come to the hospital when she returned home and the symptoms 

continued.





Spoke with Tamara Haney's outpatient psychiatrist, who has been seeing her 

since her 2015 hospitalization. She has had additional antipsychotic trials, is 

now on lurasidone, which was increased from 40mg daily to 60mg daily on 7/31/ 2018. Her family called him 1-2 weeks ago as they were concerned because she 

made a statement about going [place] the [place] and thought people were 

going to try to convert her (referring to conversion therapy to convert a 

person from homosexual to heterosexual). Although she denied any concerns about 

this at her appointment, she allowed her lurasidone to be increased. She does 

not typically endorse delusional thought content, has not endorsed SI in her 

outpatient appointments, has been presenting as pleasant and friendly in 

appointments. He notes her affect is disconnected from her statements and 

actions at times, for example family will report suicidal statements, but she 

will present as stable. She has a history of self destructive behavior, but 

later cannot explain her actions and lacks affect.  She has declined 

involvement in therapy, although it has been recommended, and will not sign a 

release for her family, although they often contact him to provide information.





On my assessment of the patient, she is pleasant, states she remembers this 

physician from her last hospitalization, and that she has continued to work 

with Dr. Lara and feels her symptoms are stable.  She denies current mood 

symptoms, paranoia, hallucinations, or concerns about her thoughts.  She 

adamantly denies that she intentionally overdosed or was trying to hurt 

herself.  She states that she was "not herself" for a few days last week, 

stating that she had diarrhea and felt "out of it" and off."  She noticed that 

she was bruising and that her bowel movements were dark, so did not take her 

Coumadin from Friday through Sunday.  She says she did not seek medical 

treatment as she was visiting her mother and sister, and her mother was 

enjoying it and she did not want to ruin the visit by leaving, but did have her 

family bring her to the ER when she returned home, she was still having 

symptoms.  She cannot explain how she might of mixed up her medications to such 

an extent, other than to say she was confused last week.  She typically keeps 

her pill bottles in a cardboard box and takes them daily, and went on her trip 

says she was using a pill organizer.  She maintains that she obtained a new 

prescription for Coumadin because she not the first bottle into the toilet and 

the pills were ruined.  She is willing to have a family member hold her 

medications and dispense them to her for increased safety, but does not feel 

that she needs psychiatric treatment or any other interventions at this time.





Past Psychiatric History


Current OP Treatment:  psychiatrist (Dr. Lara)


Prior OP Treatment:  no prior treatment


Prior Psych Hospitalizations:  [facility] (1/2015)


Past Medication Trials


risperidone - initial antipsychotic started during 2015 admission


aripiprazole - weight gain





Past Medical/Surgical History





(1) Anticoagulated on warfarin


(2) Renal hematuria


(3) Elevated INR


(4) Acute kidney injury


(5) Anemia


(6) Pulmonary embolism


(7) GERD (gastroesophageal reflux disease)


(8) Allergic asthma


PCP Dr. You





Allergies


Allergies:  


Coded Allergies:  


     Celecoxib (Verified  Allergy, Severe, airway edema, 8/6/18)


     Acarides (Mites) (Verified  Allergy, Mild, unknown, 8/6/18)


     Cat Dander (Verified  Allergy, Mild, unknown, 8/6/18)


     Dog Dander (Verified  Allergy, Mild, unknown, 8/6/18)


     Dust (Verified  Allergy, Mild, unknown, 8/6/18)


     Grass (Verified  Allergy, Mild, unknown, 8/6/18)


     Molds & Smuts (Verified  Allergy, Mild, unknown, 8/6/18)


     Clindamycin (Verified  Allergy, Unknown, unknown, 8/6/18)


     Sulfa Antibiotics (Verified  Allergy, Unknown, ., 8/6/18)


     Amoxicillin (Verified  Adverse Reaction, Mild, abdominal pain, 8/6/18)


     Ibuprofen (Verified  Adverse Reaction, Unknown, unknown, 8/6/18)


     Rofecoxib (Verified  Adverse Reaction, Unknown, unknown, 8/6/18)





Home Medications


Scheduled


Atorvastatin (Lipitor), 10 MG PO DAILY


Cholecalciferol (Vitamin D3), 1 TAB PO DAILY


Lurasidone Hcl (Latuda), 60 MG PO DAILY


Warfarin Sod (Jantoven), 7.5 MG PO 6XWK


Warfarin Sodium (Warfarin Sodium), 5 MG PO WK





Scheduled PRN


Albuterol Hfa (Ventolin Hfa), 2 PUFFS INH Q6H PRN for Shortness of Breath


Fluticasone Propionate (Nasal) (Flonase Allergy Relief), 2 SPRAYS KATIA QAM PRN 

for ALLGERY SYMPTOMS


Loratadine & Pseudoephedrine (Loratadine-D 12HR), 10 MG PO QAM PRN for Allergy 

Symptoms


Mometasone Furoate (Inhalation (Asmanex Twisthaler 120 Me), 1 PUFF INH DAILY 

PRN for Nasal Congestion


Polyethylene Glycol-Propylene (Systane), 2 DROPS OPB BID PRN for DRYNESS


Rabeprazole Sodium (Aciphex), 20 MG PO QAM PRN for Reflux





Family History





Heart disease


Hypertension


History of Suicide:  No


History of Substance Abuse:  Yes (cousin with drug and alcohol abuse)


Psychiatric History:  Yes (grandmother with depression)





Alcohol Use


Alcohol Use In Past 12 Months:  No





Smoking Use


Smoking Status:  Never Smoker





Substance History


Patient denies any history of substance abuse.





Personal History


Lives in:  [place]


Childhood:


The patient was born in [place] and raised in [facility].  She moved to 

[place] when she was 29 years old because she wanted to live [place] 

[place], and lived there for about 30 years until the fall 2014.  At that point, 

she moved back to [place], and was living with her mother in [place].


Work History:


Retired; previously worked as a , a teacher, and a .


Relationship History:  never  (Has never been in a romantic relationship)


Children:  None


Spiritual Affiliation:  Yes, attends Zoroast[employer]ism.


Psychological Trauma History:  Denies Hx Traumatic Event





Review of Systems


10 systems reviewed; positive for back pain, bruising, others negative except 

as stated above





Examination


Vital Signs





Vital Signs Past 12 Hours








  Date Time  Temp Pulse Resp B/P (MAP) Pulse Ox O2 Delivery O2 Flow Rate FiO2


 


8/8/18 10:53 36.9 84 19  100   


 


8/8/18 08:00      Room Air  


 


8/8/18 07:23 36.9 84 19 125/76 (92) 100 Room Air  


 


8/8/18 03:32 37.4 73 17 116/69 (85) 97 Room Air  











Laboratory Results





Last 24 Hours








Test


  8/7/18


15:07 8/8/18


05:31


 


Hemoglobin 7.6 g/dL  8.1 g/dL 


 


Sodium Level 139 mmol/L  142 mmol/L 


 


Potassium Level 3.9 mmol/L  3.6 mmol/L 


 


Chloride Level 111 mmol/L  110 mmol/L 


 


Carbon Dioxide Level 21 mmol/L  23 mmol/L 


 


Anion Gap 7.0 mmol/L  10.0 mmol/L 


 


Blood Urea Nitrogen 58 mg/dl  46 mg/dl 


 


Creatinine 3.50 mg/dl  2.28 mg/dl 


 


Est Creatinine Clear Calc


Drug Dose 18.3 ml/min 


  27.8 ml/min 


 


 


Estimated GFR (


American) 15.2 


  25.5 


 


 


Estimated GFR (Non-


American 13.1 


  22.0 


 


 


BUN/Creatinine Ratio 16.7  20.1 


 


Random Glucose 96 mg/dl  87 mg/dl 


 


Calcium Level 8.0 mg/dl  8.5 mg/dl 


 


White Blood Count  4.41 K/uL 


 


Red Blood Count  2.75 M/uL 


 


Hematocrit  24.0 % 


 


Mean Corpuscular Volume  87.3 fL 


 


Mean Corpuscular Hemoglobin  29.5 pg 


 


Mean Corpuscular Hemoglobin


Concent 


  33.8 g/dl 


 


 


Platelet Count  227 K/uL 


 


Mean Platelet Volume  10.4 fL 


 


Neutrophils (%) (Auto)  65.0 % 


 


Lymphocytes (%) (Auto)  20.2 % 


 


Monocytes (%) (Auto)  8.4 % 


 


Eosinophils (%) (Auto)  5.4 % 


 


Basophils (%) (Auto)  0.5 % 


 


Neutrophils # (Auto)  2.87 K/uL 


 


Lymphocytes # (Auto)  0.89 K/uL 


 


Monocytes # (Auto)  0.37 K/uL 


 


Eosinophils # (Auto)  0.24 K/uL 


 


Basophils # (Auto)  0.02 K/uL 


 


RDW Standard Deviation  44.7 fL 


 


RDW Coefficient of Variation  13.9 % 


 


Immature Granulocyte % (Auto)  0.5 % 


 


Immature Granulocyte # (Auto)  0.02 K/uL 


 


Red Blood Cell Morphology  Unremarkable 


 


Prothrombin Time  11.1 SECONDS 


 


Prothromb Time International


Ratio 


  1.1 


 











Mental Examination


During interview pt is:  alert and oriented, cooperative


Appearance:  appropriately dressed (Hospital gown and hooded sweatshirt), 

appeared stated age


Eye contact is:  good


Motor behavior is:  no abnormal motor movements


Speech:  normal in rate, rhythm & volume


Affect:  mood congruent, euthymic


Mood is:  other ("Pretty good.")


Thought process:  goal directed (Although becomes vague when asked about the 

time period around her overdose and how it may have happened, stating she was 

feeling confused.)


Thought content:  reality based without delusions


Suicidal thought are:  denied


Homicidal thoughts are:  denied


Hallucinations:  denies auditory, denies visual


Cognition:  memory grossly intact (Other than the events of last week, during 

which time she was feeling confused.), attention grossly intact, language 

grossly intact


Intelligence estimated to be:  average


Insight:  limited


Judgement:  limited





Impression / Recommendations


Impression


62-year-old single female who has a history of delusional disorder and anxiety 

NOS and is admitted medically with a supratherapeutic INR and spontaneous 

bleeding, thought to be due to Coumadin overdose.  I discussed the case with 

her outpatient psychiatrist Dr. Lara, Dr. Delcid, Lauri Mirza and 

Misael.  This is a complicated and somewhat perplexing case.  Given the 

request for an early refill, questionable explanation of accidental overdose, 

duration of time the patient waited to seek treatment after symptoms of 

bleeding began, and her history of impulsive self injury, there is certainly 

suspicion for an intentional overdose, but the patient is consistently denying 

that she intentionally overdosed, was suicidal, or wanted to harm herself, and 

I have no concrete evidence that she did.  Additionally, she denies active 

psychiatric symptoms, and her affect is normal and stable.  She does not feel 

she needs inpatient psychiatric treatment, and I do not believe she meets 

criteria for an involuntary commitment based on the information that we have.  

I approached my concerns with her in the context of wanting to ensure that she 

can take her medications safely and appropriately, and she agreed to allow her 

brother or another family member to hold her medications for her and dispense 

small amounts (such as a week's worth in a pillbox).  Dr. Delcid has spoken 

with the pharmacy and the patient's PCP, who prescribes her warfarin, and she 

will not be given such large supplies of medication in the future, and will 

also be seen weekly in the Anticoagulation Clinic.  I have asked the liaison 

nurse to contact her brother both for collateral information from the family 

and also to determine if he is willing and able to hold her medications and 

dispense them to her for safety purposes.





Risk Factors Assessment


:  Yes


/single/:  Yes


Higher / Fall in social status:  No


Access to guns:  No


Health problems:  Yes


Mental Health Diagnoses:  Yes


Substance use disorders:  No


Previous attempt:  Yes


Family history of suicide:  No


Previous psychiatric stay:  Yes


Hopelessness:  No


Smoker:  No





Protective Factors Assessment


Zoroastrian beliefs:  Yes


:  No


Responsible for young children:  No


Employed:  No


Supportive family:  Yes


Good rapport with provider:  Yes


Absence of risk factors above:  Yes (The patient denies current mood or 

psychotic symptoms, denies suicidal thoughts, intentional overdose, or other 

attempt to harm herself.  She maintains that any overdose was accidental, and 

does not feel she needs inpatient psychiatric treatment at this time.  She is 

certainly at elevated risk for harm to herself compared to the general 

population given the above risk factors, but is not demonstrating symptoms or 

behaviors that would place her at acute risk at this time.)

## 2018-08-08 NOTE — FAMILY MEDICINE PROGRESS NOTE
Progress Note


Date of Service


Aug 8, 2018.





Subjective


Pt evaluation today including:  conversation w/ patient, physical exam, chart 

review, lab review


Ms Tamara Bolanos is a 62 year old woman with a past medical history significant for 

Pulmonary Embolisms requiring lifetime warfarin therapy who presented to the 

emergency room with an unmeasurably high supratherapeutic INR. She reports a 1 

week history of immanuel hematuria, 3 episodes of melena, and significant bruising 

and joint pain. According to the patient on friday she realized her symptoms 

were probably due to warfarin overdose and stopped taking her medication. 

According to the patient her last dose was on Thursday of last week. However, 

according to what she said to other providers she also endorses potentially 

making a mistake.





She is feeling well today, she is keeping conversation very short. Eating well, 

urinating very frequently as obstructive kidney failure is beginning to clear 

and improve.





   Additional Comments:


  Constitutional:  No fever, No chills, No sweats, No weight loss, No weakness, 

No fatigue


   ENT:  + sore throat, No hearing loss, No unusual epistaxis


   Respiratory:  No shortness of breath, No cough, No sputum, No hemoptysis


   Cardiovascular:  No chest pain, No edema, No palpitations


   Abdomen:  + problem reported (Lack of Appetite), No vomiting, No diarrhea, 

No constipation


   Musculoskeletal:  + joint pain, + muscle pain, No calf pain


   Female :  + hematuria, No abnormal vaginal bleeding


   Neurologic:  No weakness, No numbness/tingling, No balance problems


   Heme:  + abnormal bleeding/bruising, + clotting problems





Medications





Current Inpatient Medications








 Medications


  (Trade)  Dose


 Ordered  Sig/Angely


 Route  Start Time


 Stop Time Status Last Admin


Dose Admin


 


 Ioversol


  (Optiray 320)  100 ml  UD  PRN


 IV  8/6/18 13:15


 8/10/18 13:14   


 


 


 Albuterol


  (Ventolin Hfa


 Inhaler)  2 puffs  Q6H  PRN


 INH  8/6/18 17:45


 9/5/18 17:44   


 


 


 Atorvastatin


 Calcium


  (Lipitor Tab)  10 mg  DAILY


 PO  8/7/18 09:00


 9/6/18 08:59  8/8/18 07:42


10 MG


 


 Cholecalciferol


  (Vitamin D Tab)  1,000


 inter.unit  DAILY


 PO  8/7/18 09:00


 9/6/18 08:59  8/8/18 07:42


1,000 INTER.UNIT


 


 Lurasidone HCl


  (Latuda Tab)  60 mg  DAILY


 PO  8/7/18 09:00


 9/6/18 08:59  8/8/18 07:42


60 MG


 


 Pantoprazole


 Sodium 40 mg/


 Syringe  10 ml @ 5


 mls/min  DAILY@11


 IV  8/7/18 11:00


 9/6/18 10:59  8/8/18 11:18


5 MLS/MIN


 


 Heparin Sodium


  (Porcine)


  (Heparin Sq 5000


 Unit/0.5ml)  5,000 unit  Q12


 SQ  8/7/18 21:00


 9/6/18 20:59  8/8/18 20:24


5,000 UNIT


 


 Potassium


 Chloride/Sodium


 Chloride  1,000 ml @ 


 125 mls/hr  Q8H


 IV  8/8/18 11:00


 9/7/18 10:29  8/8/18 20:33


125 MLS/HR











Objective


Vital Signs











  Date Time  Temp Pulse Resp B/P (MAP) Pulse Ox O2 Delivery O2 Flow Rate FiO2


 


8/8/18 16:30      Room Air  


 


8/8/18 15:26 36.7 75 18 103/69 (80) 100 Room Air  


 


8/8/18 10:53 36.9 84 19  100   


 


8/8/18 08:00      Room Air  


 


8/8/18 07:23 36.9 84 19 125/76 (92) 100 Room Air  


 


8/8/18 03:32 37.4 73 17 116/69 (85) 97 Room Air  











Physical Exam


Notes:


General Appearance:  WD/WN, no apparent distress


Respiratory/Chest:  chest non-tender, lungs clear, normal breath sounds, no 

respiratory distress, no accessory muscle use


Cardiovascular:  regular rate, rhythm, no gallop


Abdomen:  normal bowel sounds, non tender, soft


Extremities:  no calf tenderness, + pertinent finding (Extensive bruising to 

all extremities)


Neurologic/Psychiatric:  no motor/sensory deficits, alert, oriented x 3, + 

pertinent finding (Flat affect)





Laboratory Results


8/8/18 05:31








Red Blood Count 2.75, Mean Corpuscular Volume 87.3, Mean Corpuscular Hemoglobin 

29.5, Mean Corpuscular Hemoglobin Concent 33.8, Mean Platelet Volume 10.4, 

Neutrophils (%) (Auto) 65.0, Lymphocytes (%) (Auto) 20.2, Monocytes (%) (Auto) 

8.4, Eosinophils (%) (Auto) 5.4, Basophils (%) (Auto) 0.5, Neutrophils # (Auto) 

2.87, Lymphocytes # (Auto) 0.89, Monocytes # (Auto) 0.37, Eosinophils # (Auto) 

0.24, Basophils # (Auto) 0.02





8/8/18 05:31

















Test


  8/8/18


05:31


 


White Blood Count


  4.41 K/uL


(4.8-10.8)


 


Red Blood Count


  2.75 M/uL


(4.2-5.4)


 


Hemoglobin


  8.1 g/dL


(12.0-16.0)


 


Hematocrit 24.0 % (37-47) 


 


Mean Corpuscular Volume


  87.3 fL


()


 


Mean Corpuscular Hemoglobin


  29.5 pg


(25-34)


 


Mean Corpuscular Hemoglobin


Concent 33.8 g/dl


(32-36)


 


Platelet Count


  227 K/uL


(130-400)


 


Mean Platelet Volume


  10.4 fL


(7.4-10.4)


 


Neutrophils (%) (Auto) 65.0 % 


 


Lymphocytes (%) (Auto) 20.2 % 


 


Monocytes (%) (Auto) 8.4 % 


 


Eosinophils (%) (Auto) 5.4 % 


 


Basophils (%) (Auto) 0.5 % 


 


Neutrophils # (Auto)


  2.87 K/uL


(1.4-6.5)


 


Lymphocytes # (Auto)


  0.89 K/uL


(1.2-3.4)


 


Monocytes # (Auto)


  0.37 K/uL


(0.11-0.59)


 


Eosinophils # (Auto)


  0.24 K/uL


(0-0.5)


 


Basophils # (Auto)


  0.02 K/uL


(0-0.2)


 


RDW Standard Deviation


  44.7 fL


(36.4-46.3)


 


RDW Coefficient of Variation


  13.9 %


(11.5-14.5)


 


Immature Granulocyte % (Auto) 0.5 % 


 


Immature Granulocyte # (Auto)


  0.02 K/uL


(0.00-0.02)


 


Red Blood Cell Morphology Unremarkable 


 


Prothrombin Time


  11.1 SECONDS


(9.0-12.0)


 


Prothromb Time International


Ratio 1.1 (0.9-1.1) 


 


 


Anion Gap


  10.0 mmol/L


(3-11)


 


Est Creatinine Clear Calc


Drug Dose 27.8 ml/min 


 


 


Estimated GFR (


American) 25.5 


 


 


Estimated GFR (Non-


American 22.0 


 


 


BUN/Creatinine Ratio 20.1 (10-20) 


 


Calcium Level


  8.5 mg/dl


(8.5-10.1)











Assessment and Plan





Supratherapeutic INR


-Corrected with PCC four factor and vitamin K


-stable subtherapeutic level today


-Continuing to hold warfarin





DAVID 2/2 Renal hemorrhage


-Renal function greatly improving


-Creatinine at 2.28 down from 5.49





Acute blood loss anemia


-Hemoglobin of 8.1 today


-Bleeding appears halted, hemoglobin rafaela today and is stable





Depression/Schizophrenia


-Possible overdose was intentional, psych consulted on patient


-Patient filled about 360 pills in last several weeks.


-Intentional vs psychogenic vs accidental overdose





Asthma


-Stable continue to monitor.





DVT PPx


-Low dose sub q heparin


-Holding warfarin for now, still making plans based on any psych answers we 

uncover to decide discharge anticoagulation.











Resident Physician Supervision Note:





I interviewed and examined the patient. Discussed with Dr. Douglas and agree 

with findings and plan as documented in the note. Any exceptions or 

clarifications are listed here: None





Documented By:  Jaskaran Mirza


feeling better overall


no new complaints


voiding


denies having SI or intentional overdose, denies any delusions/etc causing her 

to take extra coumadin - does note that she has a very unreliable system for 

taking meds - basically just gets them out of the bottle when it's time to take 

them.  used to use a pill counter but stopped


vitals noted nad breathing unlabored no pallor or icterus





supratherapeutic INR - ?accidental ingestion vs intentional vs psychogenic vs 

high INR from poor vitamin K intake from poor PO intake (least likely) - by her 

accounts accidental - obviously will need to follow closely


-w/u underway and will need close f/u


-coumadin reversed


-will need to consider all options for long term anticoagulation w VTE hx





ARF


-appearing mostly obstructive from hydronephrosis/hydroureter due to debris 

from renal bleeding - hopefully self-resolves.  urology on consult for stenting 

if it does not.


-follow, gentle hydration 


-may also be a component of dehydration and/or prerenal from blood loss and 

poor PO intake


-outside concern on actual intrinsic renal as relates to hematuria, but follow 

closely for now as no clear evidence this is going on


-is improving





hematuria


-?spontaneous renal hemorrhage simply from markedly elevated INR vs structural 

renal lesion vs intrinsic renal disease


-follow closely


-mostly will need w/u once hemorrhage has resolved enough to "clear distractors

" from clinical picture as relates to structural vs intrinsic renal --> if no 

structural lesions noted and blood totally clears, then by process of 

elimination spontaneous bleed would be most likely.  would still want to follow 

UA periodically over time if this is the case, though


-does appear clearing





acute blood loss anemia


-see above.  follow.  appears to be stabilizing.  no indications for 

transfusion at this time.  type/screen done.  stable





GI blood loss


-from high INR.  will require GI w/u for completeness.  no urgency in this 

regard since no overt GI hemorrhage





depression/schizophrenia


-w ~360 coumadin tabs filled in about 5wks, concerning


-follow closely and try to glean ?accidental vs intentional vs psychosis driven 

- as above, she claims accidental


-psych consult





prior VTE/DVT proph


-dangerous risk/benefit balance in all regards -- prior multiple DVTs and inpt/

volume depleted/etc - so risk of recurrence is not low; bleeding due to high INR

-- delicate balance.  mechanical prophylaxis of dubious benefit.  since bleed 

appears to have been due to high INR and has stopped - probably best risk/

benefit balance is low dose SQ heparin (BID --> lower dosing, heparin since 

short half life and reversible) -- continue to follow closely


-will also need to work with dr gutierrez for best long term plan once full 

anticoagulation can be resumed.





Resident Tracking


Resident Involvement:  Resident Care Provided


Care Provided:  Adult Hospital Medicine

## 2018-08-08 NOTE — UROLOGY PROGRESS NOTE
Progress Note


Date of Service


Aug 8, 2018.





Subjective


Pt evaluation today including:  conversation w/ patient, physical exam, chart 

review, lab review


Pain:  denies


PO Intake:  tolerating


Voiding:  no voiding problems


62 year old female, history of PE - taking Coumadin, with bilateral 

hydronephrosis and debris/clot in both collecting systems and hematuria.


Creatinine normalizing, down to 2.28 this morning.


UC&S negative.


Patient reports feeling well today.


Per patient and nursing hematuria has resolved. Urine is clear x 24 hours.


Denies pain. Afebrile.


No difficulties voiding.





   Constitutional:  No fever, No chills


   Eyes:  No worsening of vision


   Respiratory:  No shortness of breath


   Cardiovascular:  No chest pain


   Abdomen:  No pain, No nausea, No vomiting


   Female :  No dysuria, No urinary frequency, No hematuria, No incontinence


   Neurologic:  No numbness/tingling


   Heme:  + see HPI





Objective


Vital Signs











  Date Time  Temp Pulse Resp B/P (MAP) Pulse Ox O2 Delivery O2 Flow Rate FiO2


 


8/8/18 10:53 36.9 84 19  100   


 


8/8/18 08:00      Room Air  


 


8/8/18 07:23 36.9 84 19 125/76 (92) 100 Room Air  


 


8/8/18 03:32 37.4 73 17 116/69 (85) 97 Room Air  


 


8/7/18 23:15 37.2 86 17 125/81 (96) 98 Room Air  


 


8/7/18 20:15      Room Air  


 


8/7/18 20:09 37.4 74 18 109/74 (86) 98 Room Air  


 


8/7/18 15:09 36.8 79 18 122/81 (95) 99 Room Air  











Physical Exam


General Appearance:  no apparent distress


Eyes:  normal inspection


ENT:  hearing grossly normal


Neck:  supple, no JVD


Respiratory/Chest:  no respiratory distress, no accessory muscle use


Cardiovascular:  no JVD


Abdomen:  non tender, soft


Extremities:  normal inspection


Neurologic/Psychiatric:  alert, normal mood/affect, oriented x 3


Skin:  normal color





Laboratory Results





Last 24 Hours








Test


  8/7/18


15:07 8/8/18


05:31


 


Hemoglobin 7.6 g/dL  8.1 g/dL 


 


Sodium Level 139 mmol/L  142 mmol/L 


 


Potassium Level 3.9 mmol/L  3.6 mmol/L 


 


Chloride Level 111 mmol/L  110 mmol/L 


 


Carbon Dioxide Level 21 mmol/L  23 mmol/L 


 


Anion Gap 7.0 mmol/L  10.0 mmol/L 


 


Blood Urea Nitrogen 58 mg/dl  46 mg/dl 


 


Creatinine 3.50 mg/dl  2.28 mg/dl 


 


Est Creatinine Clear Calc


Drug Dose 18.3 ml/min 


  27.8 ml/min 


 


 


Estimated GFR (


American) 15.2 


  25.5 


 


 


Estimated GFR (Non-


American 13.1 


  22.0 


 


 


BUN/Creatinine Ratio 16.7  20.1 


 


Random Glucose 96 mg/dl  87 mg/dl 


 


Calcium Level 8.0 mg/dl  8.5 mg/dl 


 


White Blood Count  4.41 K/uL 


 


Red Blood Count  2.75 M/uL 


 


Hematocrit  24.0 % 


 


Mean Corpuscular Volume  87.3 fL 


 


Mean Corpuscular Hemoglobin  29.5 pg 


 


Mean Corpuscular Hemoglobin


Concent 


  33.8 g/dl 


 


 


Platelet Count  227 K/uL 


 


Mean Platelet Volume  10.4 fL 


 


Neutrophils (%) (Auto)  65.0 % 


 


Lymphocytes (%) (Auto)  20.2 % 


 


Monocytes (%) (Auto)  8.4 % 


 


Eosinophils (%) (Auto)  5.4 % 


 


Basophils (%) (Auto)  0.5 % 


 


Neutrophils # (Auto)  2.87 K/uL 


 


Lymphocytes # (Auto)  0.89 K/uL 


 


Monocytes # (Auto)  0.37 K/uL 


 


Eosinophils # (Auto)  0.24 K/uL 


 


Basophils # (Auto)  0.02 K/uL 


 


RDW Standard Deviation  44.7 fL 


 


RDW Coefficient of Variation  13.9 % 


 


Immature Granulocyte % (Auto)  0.5 % 


 


Immature Granulocyte # (Auto)  0.02 K/uL 


 


Red Blood Cell Morphology  Unremarkable 


 


Prothrombin Time  11.1 SECONDS 


 


Prothromb Time International


Ratio 


  1.1 


 











Assessment and Plan


62 year old female, history of PE - taking Coumadin, with bilateral 

hydronephrosis and debris/clot in both collecting systems and hematuria.


Hematuria has resolved.


UC&S negative.


Creatinine is trending down.


No pain.


Will continue to observe.


Will arrange outpatient follow up with our service to evaluate  system once 

INR normalizes.

## 2018-08-08 NOTE — NEPHROLOGY PROGRESS NOTE
Nephrology Progress Note


Date of Service


Aug 8, 2018.





Chief Complaint


DAVID, gross hematuria





Subjective


Miss Bolanos was seen & examined in her hospital room this morning.  She reports 

mild back and R hip discomfort.  She notes that her urine is no longer bloody 

and she is voiding without difficulty.  Miss Bolanos denies fever.





Review of Systems


Constitutional:  No fever


Cardiovascular:  No chest pain


Respiratory:  No dyspnea at rest


Abdomen:  No pain, No nausea, No vomiting


Genitourinary - Female:  No dysuria, No gross hematuria


Extremities:  No leg edema


A complete review of systems was performed.  Pertinent positives are noted 

above. All other systems are negative.





Vital Signs





Last 8 Hrs








  Date Time  Temp Pulse Resp B/P (MAP) Pulse Ox O2 Delivery O2 Flow Rate FiO2


 


8/8/18 08:00      Room Air  


 


8/8/18 07:23 36.9 84 19 125/76 (92) 100 Room Air  


 


8/8/18 03:32 37.4 73 17 116/69 (85) 97 Room Air  











Last Recorded Weight


Weight (Kilograms):  94.800





Physical Exam


General Appearance:  no apparent distress


Head:  normocephalic, atraumatic


Eyes:  PERRL, EOMI


Neck:  no adenopathy


Respiratory/Chest:  lungs clear, no respiratory distress


Cardiovascular:  regular rate, rhythm, no murmur


Abdomen/GI:  normal bowel sounds, non tender, soft, + pertinent finding (

ecchymosis on abdomen and back)


Extremities/Musculoskelatal:  no calf tenderness, no pedal edema


Neurologic/Psych:  alert, oriented x 3





Family History





Heart disease


Hypertension


Negative for CKD / ESRD





Social History


Smoking Status:  Never smoker


Smokeless Tobacco Use:  No


Drug Use:  none


Marital Status:  single


Housing Status:  lives with family


Occupation:  retired


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





Laboratory Results


Past 24 Hours


8/7/18 15:07








8/8/18 05:31








Red Blood Count 2.75, Mean Corpuscular Volume 87.3, Mean Corpuscular Hemoglobin 

29.5, Mean Corpuscular Hemoglobin Concent 33.8, Mean Platelet Volume 10.4, 

Neutrophils (%) (Auto) 65.0, Lymphocytes (%) (Auto) 20.2, Monocytes (%) (Auto) 

8.4, Eosinophils (%) (Auto) 5.4, Basophils (%) (Auto) 0.5, Neutrophils # (Auto) 

2.87, Lymphocytes # (Auto) 0.89, Monocytes # (Auto) 0.37, Eosinophils # (Auto) 

0.24, Basophils # (Auto) 0.02





8/7/18 15:07








8/8/18 05:31

















Test


  8/7/18


15:07 8/8/18


05:31


 


Anion Gap


  7.0 mmol/L


(3-11) 10.0 mmol/L


(3-11)


 


Est Creatinine Clear Calc


Drug Dose 18.3 ml/min 


  27.8 ml/min 


 


 


Estimated GFR (


American) 15.2 


  25.5 


 


 


Estimated GFR (Non-


American 13.1 


  22.0 


 


 


BUN/Creatinine Ratio 16.7 (10-20)  20.1 (10-20) 


 


Calcium Level


  8.0 mg/dl


(8.5-10.1) 8.5 mg/dl


(8.5-10.1)


 


White Blood Count


  


  4.41 K/uL


(4.8-10.8)


 


Red Blood Count


  


  2.75 M/uL


(4.2-5.4)


 


Hemoglobin


  


  8.1 g/dL


(12.0-16.0)


 


Hematocrit  24.0 % (37-47) 


 


Mean Corpuscular Volume


  


  87.3 fL


()


 


Mean Corpuscular Hemoglobin


  


  29.5 pg


(25-34)


 


Mean Corpuscular Hemoglobin


Concent 


  33.8 g/dl


(32-36)


 


Platelet Count


  


  227 K/uL


(130-400)


 


Mean Platelet Volume


  


  10.4 fL


(7.4-10.4)


 


Neutrophils (%) (Auto)  65.0 % 


 


Lymphocytes (%) (Auto)  20.2 % 


 


Monocytes (%) (Auto)  8.4 % 


 


Eosinophils (%) (Auto)  5.4 % 


 


Basophils (%) (Auto)  0.5 % 


 


Neutrophils # (Auto)


  


  2.87 K/uL


(1.4-6.5)


 


Lymphocytes # (Auto)


  


  0.89 K/uL


(1.2-3.4)


 


Monocytes # (Auto)


  


  0.37 K/uL


(0.11-0.59)


 


Eosinophils # (Auto)


  


  0.24 K/uL


(0-0.5)


 


Basophils # (Auto)


  


  0.02 K/uL


(0-0.2)


 


RDW Standard Deviation


  


  44.7 fL


(36.4-46.3)


 


RDW Coefficient of Variation


  


  13.9 %


(11.5-14.5)


 


Immature Granulocyte % (Auto)  0.5 % 


 


Immature Granulocyte # (Auto)


  


  0.02 K/uL


(0.00-0.02)


 


Red Blood Cell Morphology  Unremarkable 


 


Prothrombin Time


  


  11.1 SECONDS


(9.0-12.0)


 


Prothromb Time International


Ratio 


  1.1 (0.9-1.1) 


 











Allergies


Coded Allergies:  


     Celecoxib (Verified  Allergy, Severe, airway edema, 8/6/18)


     Acarides (Mites) (Verified  Allergy, Mild, unknown, 8/6/18)


     Cat Dander (Verified  Allergy, Mild, unknown, 8/6/18)


     Dog Dander (Verified  Allergy, Mild, unknown, 8/6/18)


     Dust (Verified  Allergy, Mild, unknown, 8/6/18)


     Grass (Verified  Allergy, Mild, unknown, 8/6/18)


     Molds & Smuts (Verified  Allergy, Mild, unknown, 8/6/18)


     Clindamycin (Verified  Allergy, Unknown, unknown, 8/6/18)


     Sulfa Antibiotics (Verified  Allergy, Unknown, ., 8/6/18)


     Amoxicillin (Verified  Adverse Reaction, Mild, abdominal pain, 8/6/18)


     Ibuprofen (Verified  Adverse Reaction, Unknown, unknown, 8/6/18)


     Rofecoxib (Verified  Adverse Reaction, Unknown, unknown, 8/6/18)





Medications





Current Inpatient Medications








 Medications


  (Trade)  Dose


 Ordered  Sig/Angely


 Route  Start Time


 Stop Time Status Last Admin


Dose Admin


 


 Ioversol


  (Optiray 320)  100 ml  UD  PRN


 IV  8/6/18 13:15


 8/10/18 13:14   


 


 


 Albuterol


  (Ventolin Hfa


 Inhaler)  2 puffs  Q6H  PRN


 INH  8/6/18 17:45


 9/5/18 17:44   


 


 


 Atorvastatin


 Calcium


  (Lipitor Tab)  10 mg  DAILY


 PO  8/7/18 09:00


 9/6/18 08:59  8/8/18 07:42


10 MG


 


 Cholecalciferol


  (Vitamin D Tab)  1,000


 inter.unit  DAILY


 PO  8/7/18 09:00


 9/6/18 08:59  8/8/18 07:42


1,000 INTER.UNIT


 


 Lurasidone HCl


  (Latuda Tab)  60 mg  DAILY


 PO  8/7/18 09:00


 9/6/18 08:59  8/8/18 07:42


60 MG


 


 Pantoprazole


 Sodium 40 mg/


 Syringe  10 ml @ 5


 mls/min  DAILY@11


 IV  8/7/18 11:00


 9/6/18 10:59  8/7/18 13:11


5 MLS/MIN


 


 Heparin Sodium


  (Porcine)


  (Heparin Sq 5000


 Unit/0.5ml)  5,000 unit  Q12


 SQ  8/7/18 21:00


 9/6/18 20:59  8/8/18 07:44


5,000 UNIT


 


 Sodium Chloride  1,000 ml @ 


 125 mls/hr  Q8H


 IV  8/8/18 10:30


 9/7/18 10:29 UNV  


 











Impression





(1) Acute kidney injury


(2) Renal hemorrhage, left


(3) Renal hemorrhage, right


(4) Elevated INR


(5) Breast CA


(6) Pulmonary embolism


(7) Schizophrenia





Recommendations


Patient had obstructive uropathy related to bilateral renal hemorrhage 

associated w/ supratherapeutic INR.  Abdomial CT films 08/07 revealed bilateral 

hydronephrosis w/ debris / clot within both collecting systems.  INR has been 

corrected.  Creatinine is mildly improved from 5.5 to 2.3 (baseline 0.9).  

Patient is now nonoliguric.  UO was 2700 cc last night.  Volume status and 

electrolyte balance are acceptable at this time.  Urology has evaluated the 

patient - stents are not required at this time.  Patient will require follow up 

imaging to ensure there is no underlying structural defect responsible for the 

bilateral renal hemorrhage.  Continue w/ conservative management.  Will recheck 

PRP in am.

## 2018-08-09 VITALS
HEART RATE: 76 BPM | SYSTOLIC BLOOD PRESSURE: 101 MMHG | DIASTOLIC BLOOD PRESSURE: 68 MMHG | TEMPERATURE: 98.42 F | OXYGEN SATURATION: 99 %

## 2018-08-09 VITALS
DIASTOLIC BLOOD PRESSURE: 69 MMHG | SYSTOLIC BLOOD PRESSURE: 106 MMHG | OXYGEN SATURATION: 99 % | TEMPERATURE: 98.42 F | HEART RATE: 70 BPM

## 2018-08-09 VITALS
DIASTOLIC BLOOD PRESSURE: 68 MMHG | SYSTOLIC BLOOD PRESSURE: 115 MMHG | HEART RATE: 84 BPM | OXYGEN SATURATION: 100 % | TEMPERATURE: 98.42 F

## 2018-08-09 LAB
BASOPHILS # BLD: 0.01 K/UL (ref 0–0.2)
BASOPHILS NFR BLD: 0.2 %
BUN SERPL-MCNC: 29 MG/DL (ref 7–18)
CALCIUM SERPL-MCNC: 8 MG/DL (ref 8.5–10.1)
CO2 SERPL-SCNC: 23 MMOL/L (ref 21–32)
CREAT SERPL-MCNC: 1.6 MG/DL (ref 0.6–1.2)
EOS ABS #: 0.23 K/UL (ref 0–0.5)
EOSINOPHIL NFR BLD AUTO: 233 K/UL (ref 130–400)
GLUCOSE SERPL-MCNC: 86 MG/DL (ref 70–99)
HCT VFR BLD CALC: 22.7 % (ref 37–47)
HGB BLD-MCNC: 7.7 G/DL (ref 12–16)
IG#: 0.01 K/UL (ref 0–0.02)
IMM GRANULOCYTES NFR BLD AUTO: 21.5 %
INR PPP: 1.5 (ref 0.9–1.1)
LYMPHOCYTES # BLD: 0.94 K/UL (ref 1.2–3.4)
MCH RBC QN AUTO: 29.7 PG (ref 25–34)
MCHC RBC AUTO-ENTMCNC: 33.9 G/DL (ref 32–36)
MCV RBC AUTO: 87.6 FL (ref 80–100)
MONO ABS #: 0.28 K/UL (ref 0.11–0.59)
MONOCYTES NFR BLD: 6.4 %
NEUT ABS #: 2.9 K/UL (ref 1.4–6.5)
NEUTROPHILS # BLD AUTO: 5.3 %
NEUTROPHILS NFR BLD AUTO: 66.4 %
PMV BLD AUTO: 9.9 FL (ref 7.4–10.4)
POTASSIUM SERPL-SCNC: 4 MMOL/L (ref 3.5–5.1)
RED CELL DISTRIBUTION WIDTH CV: 13.9 % (ref 11.5–14.5)
RED CELL DISTRIBUTION WIDTH SD: 45 FL (ref 36.4–46.3)
SODIUM SERPL-SCNC: 142 MMOL/L (ref 136–145)
WBC # BLD AUTO: 4.37 K/UL (ref 4.8–10.8)

## 2018-08-09 RX ADMIN — POTASSIUM CHLORIDE AND SODIUM CHLORIDE SCH MLS/HR: 450; 150 INJECTION, SOLUTION INTRAVENOUS at 21:09

## 2018-08-09 RX ADMIN — ATORVASTATIN CALCIUM SCH MG: 10 TABLET, FILM COATED ORAL at 07:57

## 2018-08-09 RX ADMIN — HEPARIN SODIUM SCH UNIT: 10000 INJECTION, SOLUTION INTRAVENOUS; SUBCUTANEOUS at 08:00

## 2018-08-09 RX ADMIN — Medication SCH INTER.UNIT: at 07:57

## 2018-08-09 RX ADMIN — POTASSIUM CHLORIDE AND SODIUM CHLORIDE SCH MLS/HR: 450; 150 INJECTION, SOLUTION INTRAVENOUS at 04:35

## 2018-08-09 RX ADMIN — LURASIDONE HYDROCHLORIDE SCH MG: 40 TABLET, FILM COATED ORAL at 07:56

## 2018-08-09 RX ADMIN — POTASSIUM CHLORIDE AND SODIUM CHLORIDE SCH MLS/HR: 450; 150 INJECTION, SOLUTION INTRAVENOUS at 12:27

## 2018-08-09 RX ADMIN — PANTOPRAZOLE SODIUM SCH MLS/MIN: 40 INJECTION, POWDER, FOR SOLUTION INTRAVENOUS at 07:57

## 2018-08-09 NOTE — NEPHROLOGY PROGRESS NOTE
Nephrology Progress Note


Date of Service


Aug 9, 2018.





Chief Complaint


DAVID, gross hematuria





Subjective


Miss Bolanos was seen & examined in her hospital room this morning.  She reports 

that hematuria has resolved.  She currently denies fever or flank discomfort.  

She reports brisk UO.





Review of Systems


Constitutional:  No fever


Cardiovascular:  No chest pain


Respiratory:  No dyspnea at rest


Abdomen:  No pain, No vomiting, No hematochezia, No melena


Genitourinary - Female:  No dysuria, No gross hematuria


Extremities:  No leg edema


A complete review of systems was performed.  Pertinent positives are noted 

above. All other systems are negative.





Vital Signs





Last 8 Hrs








  Date Time  Temp Pulse Resp B/P (MAP) Pulse Ox O2 Delivery O2 Flow Rate FiO2


 


8/9/18 08:46      Room Air  


 


8/9/18 07:16 36.9 70 18 106/69 (81) 99 Room Air  











Last Recorded Weight


Weight (Kilograms):  94.800





Physical Exam


General Appearance:  no apparent distress


Head:  normocephalic, atraumatic


Eyes:  PERRL, EOMI


Neck:  no adenopathy


Respiratory/Chest:  lungs clear, no respiratory distress


Cardiovascular:  regular rate, rhythm, no murmur


Abdomen/GI:  normal bowel sounds, non tender, soft


Extremities/Musculoskelatal:  no calf tenderness, no pedal edema


Neurologic/Psych:  alert, oriented x 3





Family History





Heart disease


Hypertension


Negative for CKD / ESRD





Social History


Smoking Status:  Never smoker


Smokeless Tobacco Use:  No


Drug Use:  none


Marital Status:  single


Housing Status:  lives with family


Occupation:  retired


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





Laboratory Results


Past 24 Hours


8/9/18 05:13








Red Blood Count 2.59, Mean Corpuscular Volume 87.6, Mean Corpuscular Hemoglobin 

29.7, Mean Corpuscular Hemoglobin Concent 33.9, Mean Platelet Volume 9.9, 

Neutrophils (%) (Auto) 66.4, Lymphocytes (%) (Auto) 21.5, Monocytes (%) (Auto) 

6.4, Eosinophils (%) (Auto) 5.3, Basophils (%) (Auto) 0.2, Neutrophils # (Auto) 

2.90, Lymphocytes # (Auto) 0.94, Monocytes # (Auto) 0.28, Eosinophils # (Auto) 

0.23, Basophils # (Auto) 0.01





8/9/18 05:13

















Test


  8/9/18


05:13 8/9/18


08:40


 


White Blood Count


  4.37 K/uL


(4.8-10.8) 


 


 


Red Blood Count


  2.59 M/uL


(4.2-5.4) 


 


 


Hemoglobin


  7.7 g/dL


(12.0-16.0) 


 


 


Hematocrit 22.7 % (37-47)  


 


Mean Corpuscular Volume


  87.6 fL


() 


 


 


Mean Corpuscular Hemoglobin


  29.7 pg


(25-34) 


 


 


Mean Corpuscular Hemoglobin


Concent 33.9 g/dl


(32-36) 


 


 


Platelet Count


  233 K/uL


(130-400) 


 


 


Mean Platelet Volume


  9.9 fL


(7.4-10.4) 


 


 


Neutrophils (%) (Auto) 66.4 %  


 


Lymphocytes (%) (Auto) 21.5 %  


 


Monocytes (%) (Auto) 6.4 %  


 


Eosinophils (%) (Auto) 5.3 %  


 


Basophils (%) (Auto) 0.2 %  


 


Neutrophils # (Auto)


  2.90 K/uL


(1.4-6.5) 


 


 


Lymphocytes # (Auto)


  0.94 K/uL


(1.2-3.4) 


 


 


Monocytes # (Auto)


  0.28 K/uL


(0.11-0.59) 


 


 


Eosinophils # (Auto)


  0.23 K/uL


(0-0.5) 


 


 


Basophils # (Auto)


  0.01 K/uL


(0-0.2) 


 


 


RDW Standard Deviation


  45.0 fL


(36.4-46.3) 


 


 


RDW Coefficient of Variation


  13.9 %


(11.5-14.5) 


 


 


Immature Granulocyte % (Auto) 0.2 %  


 


Immature Granulocyte # (Auto)


  0.01 K/uL


(0.00-0.02) 


 


 


Red Blood Cell Morphology Unremarkable  


 


Prothrombin Time


  15.6 SECONDS


(9.0-12.0) 


 


 


Prothromb Time International


Ratio 1.5 (0.9-1.1) 


  


 


 


Anion Gap


  7.0 mmol/L


(3-11) 


 


 


Est Creatinine Clear Calc


Drug Dose 39.7 ml/min 


  


 


 


Estimated GFR (


American) 39.1 


  


 


 


Estimated GFR (Non-


American 33.7 


  


 


 


BUN/Creatinine Ratio 17.9 (10-20)  


 


Calcium Level


  8.0 mg/dl


(8.5-10.1) 


 


 


Stool Occult Blood


  


  POSITIVE


(NEGATIVE)











Allergies


Coded Allergies:  


     Celecoxib (Verified  Allergy, Severe, airway edema, 8/6/18)


     Acarides (Mites) (Verified  Allergy, Mild, unknown, 8/6/18)


     Cat Dander (Verified  Allergy, Mild, unknown, 8/6/18)


     Dog Dander (Verified  Allergy, Mild, unknown, 8/6/18)


     Dust (Verified  Allergy, Mild, unknown, 8/6/18)


     Grass (Verified  Allergy, Mild, unknown, 8/6/18)


     Molds & Smuts (Verified  Allergy, Mild, unknown, 8/6/18)


     Clindamycin (Verified  Allergy, Unknown, unknown, 8/6/18)


     Sulfa Antibiotics (Verified  Allergy, Unknown, ., 8/6/18)


     Amoxicillin (Verified  Adverse Reaction, Mild, abdominal pain, 8/6/18)


     Ibuprofen (Verified  Adverse Reaction, Unknown, unknown, 8/6/18)


     Rofecoxib (Verified  Adverse Reaction, Unknown, unknown, 8/6/18)





Medications





Current Inpatient Medications








 Medications


  (Trade)  Dose


 Ordered  Sig/Angely


 Route  Start Time


 Stop Time Status Last Admin


Dose Admin


 


 Ioversol


  (Optiray 320)  100 ml  UD  PRN


 IV  8/6/18 13:15


 8/10/18 13:14   


 


 


 Albuterol


  (Ventolin Hfa


 Inhaler)  2 puffs  Q6H  PRN


 INH  8/6/18 17:45


 9/5/18 17:44   


 


 


 Atorvastatin


 Calcium


  (Lipitor Tab)  10 mg  DAILY


 PO  8/7/18 09:00


 9/6/18 08:59  8/9/18 07:57


10 MG


 


 Cholecalciferol


  (Vitamin D Tab)  1,000


 inter.unit  DAILY


 PO  8/7/18 09:00


 9/6/18 08:59  8/9/18 07:57


1,000 INTER.UNIT


 


 Lurasidone HCl


  (Latuda Tab)  60 mg  DAILY


 PO  8/7/18 09:00


 9/6/18 08:59  8/9/18 07:56


60 MG


 


 Pantoprazole


 Sodium 40 mg/


 Syringe  10 ml @ 5


 mls/min  DAILY@11


 IV  8/7/18 11:00


 9/6/18 10:59  8/9/18 07:57


5 MLS/MIN


 


 Heparin Sodium


  (Porcine)


  (Heparin Sq 5000


 Unit/0.5ml)  5,000 unit  Q12


 SQ  8/7/18 21:00


 9/6/18 20:59 Future Hold 8/9/18 08:00


5,000 UNIT


 


 Potassium


 Chloride/Sodium


 Chloride  1,000 ml @ 


 125 mls/hr  Q8H


 IV  8/8/18 11:00


 9/7/18 10:29  8/9/18 04:35


125 MLS/HR











Impression





(1) Acute kidney injury


(2) Renal hemorrhage, left


(3) Renal hemorrhage, right


(4) Elevated INR


(5) Breast CA


(6) Pulmonary embolism


(7) Schizophrenia





Recommendations


Patient had obstructive uropathy related to bilateral renal hemorrhage 

associated w/ supratherapeutic INR.  Abdomial CT films 08/07 revealed bilateral 

hydronephrosis w/ debris / clot within both collecting systems.  INR has been 

corrected.  Creatinine is mildly improved from 5.5 to 1.6 (baseline 0.9).  

Patient is now nonoliguric.  UO was 3250 cc last night.  I&O's were matched.  

Volume status and electrolyte balance remain acceptable at this time.  Urology 

has evaluated the patient - stents were not required.  Patient will require 

follow up imaging to ensure there is no underlying structural defect 

responsible for the bilateral renal hemorrhage.  Continue w/ conservative 

management.  Will recheck PRP in am.  Recommend primary service consider blood 

transfusion to maintain Hgb > 8.0.

## 2018-08-09 NOTE — FAMILY MEDICINE PROGRESS NOTE
Progress Note


Date of Service


Aug 9, 2018.





Subjective


Pt evaluation today including:  conversation w/ patient, physical exam, chart 

review, lab review


Ms Tamara Bolanos is a 62 year old woman with a past medical history significant for 

Pulmonary Embolisms requiring lifetime warfarin therapy who presented to the 

emergency room with an unmeasurably high supratherapeutic INR. She reports a 1 

week history of immanuel hematuria, 3 episodes of melena, and significant bruising 

and joint pain. According to the patient on friday she realized her symptoms 

were probably due to warfarin overdose and stopped taking her medication. 

According to the patient her last dose was on Thursday of last week. However, 

according to what she said to other providers she also endorses potentially 

making a mistake.





She is feeling well today, with no complaints. However she is concerned about 

melanotic bowel movement with some dark red blood in it that she had. It is the 

first bowel motion she's had since she was admitted so some of the blood is 

likely from when she was still actively bleeding. 








   Additional Comments:


  Constitutional:  No fever, No chills, No sweats, No weight loss, No weakness, 

No fatigue


   ENT:  + sore throat, No hearing loss, No unusual epistaxis


   Respiratory:  No shortness of breath, No cough, No sputum, No hemoptysis


   Cardiovascular:  No chest pain, No edema, No palpitations


   Abdomen:  + problem reported (Lack of Appetite), No vomiting, No diarrhea, 

No constipation


   Musculoskeletal:  + joint pain, + muscle pain, No calf pain


   Female :  + hematuria, No abnormal vaginal bleeding


   Neurologic:  No weakness, No numbness/tingling, No balance problems


   Heme:  + abnormal bleeding/bruising, + clotting problems





Medications





Current Inpatient Medications








 Medications


  (Trade)  Dose


 Ordered  Sig/Angely


 Route  Start Time


 Stop Time Status Last Admin


Dose Admin


 


 Ioversol


  (Optiray 320)  100 ml  UD  PRN


 IV  8/6/18 13:15


 8/10/18 13:14   


 


 


 Albuterol


  (Ventolin Hfa


 Inhaler)  2 puffs  Q6H  PRN


 INH  8/6/18 17:45


 9/5/18 17:44   


 


 


 Atorvastatin


 Calcium


  (Lipitor Tab)  10 mg  DAILY


 PO  8/7/18 09:00


 9/6/18 08:59  8/9/18 07:57


10 MG


 


 Cholecalciferol


  (Vitamin D Tab)  1,000


 inter.unit  DAILY


 PO  8/7/18 09:00


 9/6/18 08:59  8/9/18 07:57


1,000 INTER.UNIT


 


 Lurasidone HCl


  (Latuda Tab)  60 mg  DAILY


 PO  8/7/18 09:00


 9/6/18 08:59  8/9/18 07:56


60 MG


 


 Pantoprazole


 Sodium 40 mg/


 Syringe  10 ml @ 5


 mls/min  DAILY@11


 IV  8/7/18 11:00


 9/6/18 10:59  8/9/18 07:57


5 MLS/MIN


 


 Heparin Sodium


  (Porcine)


  (Heparin Sq 5000


 Unit/0.5ml)  5,000 unit  Q12


 SQ  8/7/18 21:00


 9/6/18 20:59 Future Hold 8/9/18 08:00


5,000 UNIT


 


 Potassium


 Chloride/Sodium


 Chloride  1,000 ml @ 


 125 mls/hr  Q8H


 IV  8/8/18 11:00


 9/7/18 10:29  8/9/18 12:27


125 MLS/HR











Objective


Vital Signs











  Date Time  Temp Pulse Resp B/P (MAP) Pulse Ox O2 Delivery O2 Flow Rate FiO2


 


8/9/18 16:00      Room Air  


 


8/9/18 15:16 36.9 76 18 101/68 (79) 99 Room Air  


 


8/9/18 08:46      Room Air  


 


8/9/18 07:16 36.9 70 18 106/69 (81) 99 Room Air  


 


8/9/18 00:00      Room Air  


 


8/8/18 23:30 37.1 78 18 107/69 (82) 99 Room Air  











Physical Exam


Notes:


General Appearance:  WD/WN, no apparent distress


Respiratory/Chest:  chest non-tender, lungs clear, normal breath sounds, no 

respiratory distress, no accessory muscle use


Cardiovascular:  regular rate, rhythm, no gallop


Abdomen:  normal bowel sounds, non tender, soft


Extremities:  no calf tenderness, + pertinent finding (Extensive bruising to 

all extremities)


Neurologic/Psychiatric:  no motor/sensory deficits, alert, oriented x 3, + 

pertinent finding (Flat affect)





Laboratory Results


8/9/18 05:13








Red Blood Count 2.59, Mean Corpuscular Volume 87.6, Mean Corpuscular Hemoglobin 

29.7, Mean Corpuscular Hemoglobin Concent 33.9, Mean Platelet Volume 9.9, 

Neutrophils (%) (Auto) 66.4, Lymphocytes (%) (Auto) 21.5, Monocytes (%) (Auto) 

6.4, Eosinophils (%) (Auto) 5.3, Basophils (%) (Auto) 0.2, Neutrophils # (Auto) 

2.90, Lymphocytes # (Auto) 0.94, Monocytes # (Auto) 0.28, Eosinophils # (Auto) 

0.23, Basophils # (Auto) 0.01





8/9/18 13:07








8/9/18 05:13

















Test


  8/9/18


05:13 8/9/18


08:40


 


White Blood Count


  4.37 K/uL


(4.8-10.8) 


 


 


Red Blood Count


  2.59 M/uL


(4.2-5.4) 


 


 


Hemoglobin


  7.7 g/dL


(12.0-16.0) 


 


 


Hematocrit 22.7 % (37-47)  


 


Mean Corpuscular Volume


  87.6 fL


() 


 


 


Mean Corpuscular Hemoglobin


  29.7 pg


(25-34) 


 


 


Mean Corpuscular Hemoglobin


Concent 33.9 g/dl


(32-36) 


 


 


Platelet Count


  233 K/uL


(130-400) 


 


 


Mean Platelet Volume


  9.9 fL


(7.4-10.4) 


 


 


Neutrophils (%) (Auto) 66.4 %  


 


Lymphocytes (%) (Auto) 21.5 %  


 


Monocytes (%) (Auto) 6.4 %  


 


Eosinophils (%) (Auto) 5.3 %  


 


Basophils (%) (Auto) 0.2 %  


 


Neutrophils # (Auto)


  2.90 K/uL


(1.4-6.5) 


 


 


Lymphocytes # (Auto)


  0.94 K/uL


(1.2-3.4) 


 


 


Monocytes # (Auto)


  0.28 K/uL


(0.11-0.59) 


 


 


Eosinophils # (Auto)


  0.23 K/uL


(0-0.5) 


 


 


Basophils # (Auto)


  0.01 K/uL


(0-0.2) 


 


 


RDW Standard Deviation


  45.0 fL


(36.4-46.3) 


 


 


RDW Coefficient of Variation


  13.9 %


(11.5-14.5) 


 


 


Immature Granulocyte % (Auto) 0.2 %  


 


Immature Granulocyte # (Auto)


  0.01 K/uL


(0.00-0.02) 


 


 


Red Blood Cell Morphology Unremarkable  


 


Prothrombin Time


  15.6 SECONDS


(9.0-12.0) 


 


 


Prothromb Time International


Ratio 1.5 (0.9-1.1) 


  


 


 


Anion Gap


  7.0 mmol/L


(3-11) 


 


 


Est Creatinine Clear Calc


Drug Dose 39.7 ml/min 


  


 


 


Estimated GFR (


American) 39.1 


  


 


 


Estimated GFR (Non-


American 33.7 


  


 


 


BUN/Creatinine Ratio 17.9 (10-20)  


 


Calcium Level


  8.0 mg/dl


(8.5-10.1) 


 


 


Stool Occult Blood


  


  POSITIVE


(NEGATIVE)











Assessment and Plan


Supratherapeutic INR


-Corrected with PCC four factor and vitamin K


-stable subtherapeutic level today


-Continuing to hold warfarin





DAVID 2/2 Renal hemorrhage


-Renal function greatly improving


-Creatinine at 1.6 down from 5.49





Acute blood loss anemia


-Hemoglobin of 7.7 today elena a second hemoglobin this afternoon and it had 

returned to 8.1


-Bleeding appears halted, hemoglobin is stable





Depression/Schizophrenia


-Possible overdose was intentional, psych consulted on patient


-Patient filled about 360 pills in last several weeks.


-Intentional vs psychogenic vs accidental overdose





Asthma


-Stable continue to monitor.





DVT PPx


-Low dose sub q heparin


-Holding warfarin for now, still making plans based on any psych answers we 

uncover to decide discharge anticoagulation.





Resident Physician Supervision Note:





I interviewed and examined the patient. Discussed with Dr. Douglas and agree 

with findings and plan as documented in the note. Any exceptions or 

clarifications are listed here: None





Documented By:  Jaskaran Mirza


feeling better overall but did have some bloody stools today





vitals noted nad breathing unlabored no pallor or icterus





supratherapeutic INR - ?accidental ingestion vs intentional vs psychogenic vs 

high INR from poor vitamin K intake from poor PO intake (least likely) - by her 

accounts accidental - obviously will need to follow closely


-w/u underway and will need close f/u


-coumadin reversed -- INR rising again - additional K today


-will need to consider all options for long term anticoagulation w VTE hx





ARF


-appearing mostly obstructive from hydronephrosis/hydroureter due to debris 

from renal bleeding - hopefully self-resolves.  urology on consult for stenting 

if it does not.


-follow, gentle hydration 


-may also be a component of dehydration and/or prerenal from blood loss and 

poor PO intake


-outside concern on actual intrinsic renal as relates to hematuria, but follow 

closely for now as no clear evidence this is going on


-is improving, ongoing improvmeent, follow





hematuria


-?spontaneous renal hemorrhage simply from markedly elevated INR vs structural 

renal lesion vs intrinsic renal disease


-follow closely


-mostly will need w/u once hemorrhage has resolved enough to "clear distractors

" from clinical picture as relates to structural vs intrinsic renal --> if no 

structural lesions noted and blood totally clears, then by process of 

elimination spontaneous bleed would be most likely.  would still want to follow 

UA periodically over time if this is the case, though


-does appear clearing, at this point definitive w/u as outpt





acute blood loss anemia


-see above.  follow.  appears to be stabilizing.  no indications for 

transfusion at this time.  type/screen done.  stable





GI blood loss


-from high INR.  will require GI w/u for completeness.  follow since she showed 

blood todya, but with stable vitals and stable Hgb - follow closely, no clear 

need for urgent intervention - scopes as outpt unless recurs/worsens





depression/schizophrenia


-w ~360 coumadin tabs filled in about 5wks, concerning


-follow closely and try to glean ?accidental vs intentional vs psychosis driven 

- as above, she claims accidental


-psych consult





prior VTE/DVT proph


-dangerous risk/benefit balance in all regards -- prior multiple DVTs and inpt/

volume depleted/etc - so risk of recurrence is not low; bleeding due to high INR

-- delicate balance.  mechanical prophylaxis of dubious benefit.  since bleed 

appears to have been due to high INR and has stopped - probably best risk/

benefit balance is low dose SQ heparin (BID --> lower dosing, heparin since 

short half life and reversible) -- continue to follow closely


-will also need to work with dr gutierrez for best long term plan once full 

anticoagulation can be resumed.





Resident Tracking


Resident Involvement:  Resident Care Provided


Care Provided:  Adult Hospital Medicine

## 2018-08-10 VITALS
TEMPERATURE: 98.24 F | OXYGEN SATURATION: 99 % | DIASTOLIC BLOOD PRESSURE: 71 MMHG | SYSTOLIC BLOOD PRESSURE: 108 MMHG | HEART RATE: 74 BPM

## 2018-08-10 VITALS
TEMPERATURE: 98.6 F | OXYGEN SATURATION: 100 % | SYSTOLIC BLOOD PRESSURE: 108 MMHG | HEART RATE: 80 BPM | DIASTOLIC BLOOD PRESSURE: 73 MMHG

## 2018-08-10 LAB
BASOPHILS # BLD: 0.02 K/UL (ref 0–0.2)
BASOPHILS NFR BLD: 0.5 %
BUN SERPL-MCNC: 19 MG/DL (ref 7–18)
CALCIUM SERPL-MCNC: 7.9 MG/DL (ref 8.5–10.1)
CO2 SERPL-SCNC: 24 MMOL/L (ref 21–32)
CREAT SERPL-MCNC: 1.21 MG/DL (ref 0.6–1.2)
EOS ABS #: 0.21 K/UL (ref 0–0.5)
EOSINOPHIL NFR BLD AUTO: 248 K/UL (ref 130–400)
GLUCOSE SERPL-MCNC: 83 MG/DL (ref 70–99)
HCT VFR BLD CALC: 22 % (ref 37–47)
HGB BLD-MCNC: 7.2 G/DL (ref 12–16)
IG#: 0.01 K/UL (ref 0–0.02)
IMM GRANULOCYTES NFR BLD AUTO: 31.4 %
INR PPP: 1.1 (ref 0.9–1.1)
LYMPHOCYTES # BLD: 1.29 K/UL (ref 1.2–3.4)
MCH RBC QN AUTO: 29 PG (ref 25–34)
MCHC RBC AUTO-ENTMCNC: 32.7 G/DL (ref 32–36)
MCV RBC AUTO: 88.7 FL (ref 80–100)
MONO ABS #: 0.23 K/UL (ref 0.11–0.59)
MONOCYTES NFR BLD: 5.6 %
NEUT ABS #: 2.35 K/UL (ref 1.4–6.5)
NEUTROPHILS # BLD AUTO: 5.1 %
NEUTROPHILS NFR BLD AUTO: 57.2 %
PMV BLD AUTO: 10 FL (ref 7.4–10.4)
POTASSIUM SERPL-SCNC: 4.2 MMOL/L (ref 3.5–5.1)
RED CELL DISTRIBUTION WIDTH CV: 13.9 % (ref 11.5–14.5)
RED CELL DISTRIBUTION WIDTH SD: 45.2 FL (ref 36.4–46.3)
SODIUM SERPL-SCNC: 144 MMOL/L (ref 136–145)
WBC # BLD AUTO: 4.11 K/UL (ref 4.8–10.8)

## 2018-08-10 RX ADMIN — LURASIDONE HYDROCHLORIDE SCH MG: 40 TABLET, FILM COATED ORAL at 08:02

## 2018-08-10 RX ADMIN — PANTOPRAZOLE SODIUM SCH MLS/MIN: 40 INJECTION, POWDER, FOR SOLUTION INTRAVENOUS at 20:33

## 2018-08-10 RX ADMIN — PANTOPRAZOLE SODIUM SCH MLS/MIN: 40 INJECTION, POWDER, FOR SOLUTION INTRAVENOUS at 08:02

## 2018-08-10 RX ADMIN — POTASSIUM CHLORIDE AND SODIUM CHLORIDE SCH MLS/HR: 450; 150 INJECTION, SOLUTION INTRAVENOUS at 05:14

## 2018-08-10 RX ADMIN — POTASSIUM CHLORIDE AND SODIUM CHLORIDE SCH MLS/HR: 450; 150 INJECTION, SOLUTION INTRAVENOUS at 13:12

## 2018-08-10 RX ADMIN — ATORVASTATIN CALCIUM SCH MG: 10 TABLET, FILM COATED ORAL at 08:02

## 2018-08-10 RX ADMIN — Medication SCH INTER.UNIT: at 08:02

## 2018-08-10 NOTE — FAMILY MEDICINE PROGRESS NOTE
Progress Note


Date of Service


Aug 10, 2018.





Subjective


Pt evaluation today including:  conversation w/ patient, physical exam


Ms Tamara Bolanos is a 62 year old woman with a past medical history significant for 

Pulmonary Embolisms requiring lifetime warfarin therapy who presented to the 

emergency room with an unmeasurably high supratherapeutic INR. She reports a 1 

week history of immanuel hematuria, 3 episodes of melena, and significant bruising 

and joint pain. According to the patient on friday she realized her symptoms 

were probably due to warfarin overdose and stopped taking her medication. 

According to the patient her last dose was on Thursday of last week. However, 

according to what she said to other providers she also endorses potentially 

making a mistake.





She is feeling well today, with no complaints. Had a bowel motion that looked 

much less melanotic than the previous day's and her urine looks clear to her, 

but she did have a drop in hemoglobin from 8.1 to 7.7 today with no obvious 

source of bleeding.








  Constitutional:  No fever, No chills, No sweats, No weight loss, No weakness, 

No fatigue


   ENT:  + sore throat, No hearing loss, No unusual epistaxis


   Respiratory:  No shortness of breath, No cough, No sputum, No hemoptysis


   Cardiovascular:  No chest pain, No edema, No palpitations


   Abdomen:  + problem reported (Lack of Appetite), No vomiting, No diarrhea, 

No constipation


   Musculoskeletal:  + joint pain, + muscle pain, No calf pain


   Female :  + hematuria, No abnormal vaginal bleeding


   Neurologic:  No weakness, No numbness/tingling, No balance problems


   Heme:  + abnormal bleeding/bruising, + clotting problems





Medications





Current Inpatient Medications








 Medications


  (Trade)  Dose


 Ordered  Sig/Angely


 Route  Start Time


 Stop Time Status Last Admin


Dose Admin


 


 Albuterol


  (Ventolin Hfa


 Inhaler)  2 puffs  Q6H  PRN


 INH  8/6/18 17:45


 9/5/18 17:44   


 


 


 Atorvastatin


 Calcium


  (Lipitor Tab)  10 mg  DAILY


 PO  8/7/18 09:00


 9/6/18 08:59  8/10/18 08:02


10 MG


 


 Cholecalciferol


  (Vitamin D Tab)  1,000


 inter.unit  DAILY


 PO  8/7/18 09:00


 9/6/18 08:59  8/10/18 08:02


1,000 INTER.UNIT


 


 Lurasidone HCl


  (Latuda Tab)  60 mg  DAILY


 PO  8/7/18 09:00


 9/6/18 08:59  8/10/18 08:02


60 MG


 


 Heparin Sodium


  (Porcine)


  (Heparin Sq 5000


 Unit/0.5ml)  5,000 unit  Q12


 SQ  8/7/18 21:00


 9/6/18 20:59 Future Hold 8/9/18 08:00


5,000 UNIT


 


 Pantoprazole


 Sodium 40 mg/


 Syringe  10 ml @ 5


 mls/min  BID


 IV  8/10/18 20:00


 9/9/18 19:59   


 


 


 Polyethylene


  (Miralax Powder


 Packet)  119 gm  ONE  ONCE


 PO  8/12/18 17:00


 8/12/18 17:01   


 


 


 Polyethylene


  (Miralax Powder


 Packet)  119 gm  ONE  ONCE


 PO  8/12/18 21:00


 8/12/18 21:01   


 


 


 Bisacodyl


  (Dulcolax Tab)  20 mg  ONE  ONCE


 PO  8/12/18 17:00


 8/12/18 17:01   


 











Objective


Vital Signs











  Date Time  Temp Pulse Resp B/P (MAP) Pulse Ox O2 Delivery O2 Flow Rate FiO2


 


8/10/18 16:00      Room Air  


 


8/10/18 14:55 37.0 80 18 108/73 (85) 100 Room Air  


 


8/10/18 08:53      Room Air  


 


8/10/18 07:23 36.8 74 18 108/71 (83) 99 Room Air  


 


8/10/18 00:00      Room Air  


 


8/9/18 23:58 36.9 84 18 115/68 (84) 100 Room Air  











Physical Exam


Notes:


General Appearance:  WD/WN, no apparent distress


Respiratory/Chest:  chest non-tender, lungs clear, normal breath sounds, no 

respiratory distress, no accessory muscle use


Cardiovascular:  regular rate, rhythm, no gallop


Abdomen:  normal bowel sounds, non tender, soft


Extremities:  no calf tenderness, + pertinent finding (Extensive bruising to 

all extremities)


Neurologic/Psychiatric:  no motor/sensory deficits, alert, oriented x 3, + 

pertinent finding (Flat affect)





Laboratory Results


8/10/18 05:52








Red Blood Count 2.48, Mean Corpuscular Volume 88.7, Mean Corpuscular Hemoglobin 

29.0, Mean Corpuscular Hemoglobin Concent 32.7, Mean Platelet Volume 10.0, 

Neutrophils (%) (Auto) 57.2, Lymphocytes (%) (Auto) 31.4, Monocytes (%) (Auto) 

5.6, Eosinophils (%) (Auto) 5.1, Basophils (%) (Auto) 0.5, Neutrophils # (Auto) 

2.35, Lymphocytes # (Auto) 1.29, Monocytes # (Auto) 0.23, Eosinophils # (Auto) 

0.21, Basophils # (Auto) 0.02





8/10/18 16:23








8/10/18 05:52

















Test


  8/10/18


05:52 8/10/18


08:29


 


White Blood Count


  4.11 K/uL


(4.8-10.8) 


 


 


Red Blood Count


  2.48 M/uL


(4.2-5.4) 


 


 


Hemoglobin


  7.2 g/dL


(12.0-16.0) 


 


 


Hematocrit 22.0 % (37-47)  


 


Mean Corpuscular Volume


  88.7 fL


() 


 


 


Mean Corpuscular Hemoglobin


  29.0 pg


(25-34) 


 


 


Mean Corpuscular Hemoglobin


Concent 32.7 g/dl


(32-36) 


 


 


Platelet Count


  248 K/uL


(130-400) 


 


 


Mean Platelet Volume


  10.0 fL


(7.4-10.4) 


 


 


Neutrophils (%) (Auto) 57.2 %  


 


Lymphocytes (%) (Auto) 31.4 %  


 


Monocytes (%) (Auto) 5.6 %  


 


Eosinophils (%) (Auto) 5.1 %  


 


Basophils (%) (Auto) 0.5 %  


 


Neutrophils # (Auto)


  2.35 K/uL


(1.4-6.5) 


 


 


Lymphocytes # (Auto)


  1.29 K/uL


(1.2-3.4) 


 


 


Monocytes # (Auto)


  0.23 K/uL


(0.11-0.59) 


 


 


Eosinophils # (Auto)


  0.21 K/uL


(0-0.5) 


 


 


Basophils # (Auto)


  0.02 K/uL


(0-0.2) 


 


 


RDW Standard Deviation


  45.2 fL


(36.4-46.3) 


 


 


RDW Coefficient of Variation


  13.9 %


(11.5-14.5) 


 


 


Immature Granulocyte % (Auto) 0.2 %  


 


Immature Granulocyte # (Auto)


  0.01 K/uL


(0.00-0.02) 


 


 


Red Blood Cell Morphology Unremarkable  


 


Anion Gap


  8.0 mmol/L


(3-11) 


 


 


Est Creatinine Clear Calc


Drug Dose 52.5 ml/min 


  


 


 


Estimated GFR (


American) 54.8 


  


 


 


Estimated GFR (Non-


American 47.2 


  


 


 


BUN/Creatinine Ratio 15.3 (10-20)  


 


Calcium Level


  7.9 mg/dl


(8.5-10.1) 


 


 


Prothrombin Time


  


  12.0 SECONDS


(9.0-12.0)


 


Prothromb Time International


Ratio 


  1.1 (0.9-1.1) 


 











Assessment and Plan


Supratherapeutic INR


-Corrected with PCC four factor and vitamin K


-stable 1.1 today


-Continuing to hold warfarin





DAVID 2/2 Renal hemorrhage


-Renal function greatly improving


-Creatinine at 1.2 down from 5.49





Acute blood loss anemia


-Hemoglobin of 7.2 today elena a second hemoglobin this afternoon 7.5


-Potential Gastrointestinal bleed


-GI consulted planning for scope on Monday





Depression/Schizophrenia


-Possible overdose was intentional, psych consulted on patient


-Patient filled about 360 pills in last several weeks.


-Intentional vs psychogenic vs accidental overdose





Asthma


-Stable continue to monitor.





DVT PPx


-Low dose sub q heparin


-Holding warfarin for now, still making plans based on any psych answers we 

uncover to decide discharge anticoagulation.


Resident Physician Supervision Note:





I interviewed and examined the patient. Discussed with Dr. Douglas and agree 

with findings and plan as documented in the note. Any exceptions or 

clarifications are listed here: None





Documented By:  Jaskaran Mirza


no further bloody stools but Hgb did drop again


d/w GI - for endoscopic w/u





vitals noted nad breathing unlabored no pallor or icterus





supratherapeutic INR - ?accidental ingestion vs intentional vs psychogenic vs 

high INR from poor vitamin K intake from poor PO intake (least likely) - by her 

accounts accidental - obviously will need to follow closely


-w/u underway and will need close f/u


-coumadin reversed -- but INR rafaela late - so continue to follow


-will need to consider all options for long term anticoagulation w VTE hx - for 

now biggest thing is family supervision and keeping only a limited amount of 

med available for pt at a time





ARF


-appearing mostly obstructive from hydronephrosis/hydroureter due to debris 

from renal bleeding - hopefully self-resolves.  urology on consult for stenting 

if it does not.


-follow, gentle hydration 


-may also be a component of dehydration and/or prerenal from blood loss and 

poor PO intake


-outside concern on actual intrinsic renal as relates to hematuria, but follow 

closely for now as no clear evidence this is going on


-is improving, ongoing improvement





hematuria


-?spontaneous renal hemorrhage simply from markedly elevated INR vs structural 

renal lesion vs intrinsic renal disease


-follow closely


-mostly will need w/u once hemorrhage has resolved enough to "clear distractors

" from clinical picture as relates to structural vs intrinsic renal --> if no 

structural lesions noted and blood totally clears, then by process of 

elimination spontaneous bleed would be most likely.  would still want to follow 

UA periodically over time if this is the case, though


-does appear clearing, at this point definitive w/u as outpt





acute blood loss anemia


-see above.  follow.  appears to be stabilizing.  no indications for 

transfusion at this time.  type/screen done.  stable





GI blood loss


-from high INR.  but with concern on how INR got high, concern taht she bled at 

home for about 3 days before seeking care, now has much less margin of error, 

and late bleeding (yesterday, when INR wasn't nearly as high, suggesting 

possible source that could bleed easier) - scopes while inpt.  GI input 

apprecaiated





depression/schizophrenia


-w ~360 coumadin tabs filled in about 5wks, concerning


-follow closely and try to glean ?accidental vs intentional vs psychosis driven 

- as above, she claims accidental


-psych consult





prior VTE/DVT proph


-dangerous risk/benefit balance in all regards -- was on SQ heparin - but then 

drop in Hgb.  have to hold for now.  follow closely.  mechanical proph of 

dubious benefit and possible harm.





Resident Tracking


Resident Involvement:  Resident Care Provided


Care Provided:  Adult Hospital Medicine

## 2018-08-10 NOTE — NEPHROLOGY PROGRESS NOTE
Nephrology Progress Note


Date of Service


Aug 10, 2018.





Chief Complaint


DAVID, gross hematuria





Subjective


Miss Bolanos was seen & examined in her hospital room this morning.  She reports 

that she is voiding without difficulty.  She has had no further flank pain or 

gross hematuria.  She voices no new medical concerns.





Review of Systems


Constitutional:  No fever


Cardiovascular:  No chest pain


Abdomen:  No pain, No vomiting, No diarrhea


Genitourinary - Female:  No gross hematuria


Extremities:  No leg edema


A complete review of systems was performed.  Pertinent positives are noted 

above. All other systems are negative.





Vital Signs





Last 8 Hrs








  Date Time  Temp Pulse Resp B/P (MAP) Pulse Ox O2 Delivery O2 Flow Rate FiO2


 


8/10/18 08:53      Room Air  


 


8/10/18 07:23 36.8 74 18 108/71 (83) 99 Room Air  











Last Recorded Weight


Weight (Kilograms):  94.800





Physical Exam


General Appearance:  no apparent distress


Head:  normocephalic, atraumatic


Eyes:  PERRL, EOMI


Neck:  no adenopathy


Respiratory/Chest:  lungs clear, no respiratory distress


Cardiovascular:  regular rate, rhythm


Abdomen/GI:  normal bowel sounds, non tender, soft


Extremities/Musculoskelatal:  no calf tenderness, no pedal edema


Neurologic/Psych:  alert, oriented x 3





Family History





Heart disease


Hypertension


Negative for CKD / ESRD





Social History


Smoking Status:  Never smoker


Smokeless Tobacco Use:  No


Drug Use:  none


Marital Status:  single


Housing Status:  lives with family


Occupation:  retired


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





Laboratory Results


Past 24 Hours


8/9/18 13:07








8/10/18 05:52








Red Blood Count 2.48, Mean Corpuscular Volume 88.7, Mean Corpuscular Hemoglobin 

29.0, Mean Corpuscular Hemoglobin Concent 32.7, Mean Platelet Volume 10.0, 

Neutrophils (%) (Auto) 57.2, Lymphocytes (%) (Auto) 31.4, Monocytes (%) (Auto) 

5.6, Eosinophils (%) (Auto) 5.1, Basophils (%) (Auto) 0.5, Neutrophils # (Auto) 

2.35, Lymphocytes # (Auto) 1.29, Monocytes # (Auto) 0.23, Eosinophils # (Auto) 

0.21, Basophils # (Auto) 0.02





8/10/18 05:52

















Test


  8/10/18


05:52 8/10/18


08:29


 


White Blood Count


  4.11 K/uL


(4.8-10.8) 


 


 


Red Blood Count


  2.48 M/uL


(4.2-5.4) 


 


 


Hemoglobin


  7.2 g/dL


(12.0-16.0) 


 


 


Hematocrit 22.0 % (37-47)  


 


Mean Corpuscular Volume


  88.7 fL


() 


 


 


Mean Corpuscular Hemoglobin


  29.0 pg


(25-34) 


 


 


Mean Corpuscular Hemoglobin


Concent 32.7 g/dl


(32-36) 


 


 


Platelet Count


  248 K/uL


(130-400) 


 


 


Mean Platelet Volume


  10.0 fL


(7.4-10.4) 


 


 


Neutrophils (%) (Auto) 57.2 %  


 


Lymphocytes (%) (Auto) 31.4 %  


 


Monocytes (%) (Auto) 5.6 %  


 


Eosinophils (%) (Auto) 5.1 %  


 


Basophils (%) (Auto) 0.5 %  


 


Neutrophils # (Auto)


  2.35 K/uL


(1.4-6.5) 


 


 


Lymphocytes # (Auto)


  1.29 K/uL


(1.2-3.4) 


 


 


Monocytes # (Auto)


  0.23 K/uL


(0.11-0.59) 


 


 


Eosinophils # (Auto)


  0.21 K/uL


(0-0.5) 


 


 


Basophils # (Auto)


  0.02 K/uL


(0-0.2) 


 


 


RDW Standard Deviation


  45.2 fL


(36.4-46.3) 


 


 


RDW Coefficient of Variation


  13.9 %


(11.5-14.5) 


 


 


Immature Granulocyte % (Auto) 0.2 %  


 


Immature Granulocyte # (Auto)


  0.01 K/uL


(0.00-0.02) 


 


 


Red Blood Cell Morphology Unremarkable  


 


Anion Gap


  8.0 mmol/L


(3-11) 


 


 


Est Creatinine Clear Calc


Drug Dose 52.5 ml/min 


  


 


 


Estimated GFR (


American) 54.8 


  


 


 


Estimated GFR (Non-


American 47.2 


  


 


 


BUN/Creatinine Ratio 15.3 (10-20)  


 


Calcium Level


  7.9 mg/dl


(8.5-10.1) 


 


 


Prothrombin Time


  


  12.0 SECONDS


(9.0-12.0)


 


Prothromb Time International


Ratio 


  1.1 (0.9-1.1) 


 











Allergies


Coded Allergies:  


     Celecoxib (Verified  Allergy, Severe, airway edema, 8/6/18)


     Acarides (Mites) (Verified  Allergy, Mild, unknown, 8/6/18)


     Cat Dander (Verified  Allergy, Mild, unknown, 8/6/18)


     Dog Dander (Verified  Allergy, Mild, unknown, 8/6/18)


     Dust (Verified  Allergy, Mild, unknown, 8/6/18)


     Grass (Verified  Allergy, Mild, unknown, 8/6/18)


     Molds & Smuts (Verified  Allergy, Mild, unknown, 8/6/18)


     Clindamycin (Verified  Allergy, Unknown, unknown, 8/6/18)


     Sulfa Antibiotics (Verified  Allergy, Unknown, ., 8/6/18)


     Amoxicillin (Verified  Adverse Reaction, Mild, abdominal pain, 8/6/18)


     Ibuprofen (Verified  Adverse Reaction, Unknown, unknown, 8/6/18)


     Rofecoxib (Verified  Adverse Reaction, Unknown, unknown, 8/6/18)





Medications





Current Inpatient Medications








 Medications


  (Trade)  Dose


 Ordered  Sig/Angely


 Route  Start Time


 Stop Time Status Last Admin


Dose Admin


 


 Ioversol


  (Optiray 320)  100 ml  UD  PRN


 IV  8/6/18 13:15


 8/10/18 13:14   


 


 


 Albuterol


  (Ventolin Hfa


 Inhaler)  2 puffs  Q6H  PRN


 INH  8/6/18 17:45


 9/5/18 17:44   


 


 


 Atorvastatin


 Calcium


  (Lipitor Tab)  10 mg  DAILY


 PO  8/7/18 09:00


 9/6/18 08:59  8/10/18 08:02


10 MG


 


 Cholecalciferol


  (Vitamin D Tab)  1,000


 inter.unit  DAILY


 PO  8/7/18 09:00


 9/6/18 08:59  8/10/18 08:02


1,000 INTER.UNIT


 


 Lurasidone HCl


  (Latuda Tab)  60 mg  DAILY


 PO  8/7/18 09:00


 9/6/18 08:59  8/10/18 08:02


60 MG


 


 Pantoprazole


 Sodium 40 mg/


 Syringe  10 ml @ 5


 mls/min  DAILY@11


 IV  8/7/18 11:00


 9/6/18 10:59  8/10/18 08:02


5 MLS/MIN


 


 Heparin Sodium


  (Porcine)


  (Heparin Sq 5000


 Unit/0.5ml)  5,000 unit  Q12


 SQ  8/7/18 21:00


 9/6/18 20:59 Future Hold 8/9/18 08:00


5,000 UNIT


 


 Potassium


 Chloride/Sodium


 Chloride  1,000 ml @ 


 125 mls/hr  Q8H


 IV  8/8/18 11:00


 9/7/18 10:29  8/10/18 05:14


125 MLS/HR











Impression





(1) Acute kidney injury


(2) Renal hemorrhage, left


(3) Renal hemorrhage, right


(4) Elevated INR


(5) Breast CA


(6) Pulmonary embolism


(7) Schizophrenia





Recommendations


Patient had obstructive uropathy related to bilateral renal hemorrhage 

associated w/ supratherapeutic INR.  Abdomial CT films 08/07 revealed bilateral 

hydronephrosis with debris / clot within both collecting systems.  INR has been 

corrected.  Creatinine is now improved from 5.5 to 1.2 (baseline 0.9).  Patient 

remains nonoliguric.  UO was 4000 cc last night.  I&O's were essentially 

matched.  Volume status and electrolyte balance remain acceptable at this time.

  Urology has evaluated the patient - stents were not required.  Patient will 

require follow up imaging as outpatient to ensure there is no underlying 

structural defect responsible for the bilateral renal hemorrhage.  Continue w/ 

conservative management.  Recommend primary service consider blood transfusion 

to maintain Hgb > 8.0.





No further Nephrology evaluation needed at this time.  Will sign off.  Patient 

will require outpatient follow up with her PCP and Urology.  Urology has 

indicated that they will order follow up renal imaging

## 2018-08-10 NOTE — GASTROINTESTINAL CONSULTATION
Gastrointestinal Consultation


Date of Consultation:  Aug 10, 2018


Attending Physician:  Jaskaran Mirza


Consulting Physician:  Ric Hammer


Reason for Consultation:  GI bleed


History of Present Illness


Patient is a 62 year old female w multiple PEs, on Warfarin, presented with 

hematuria, and c/o black stools. INR on presentation was >10. There was 

questionable issue with whether pt is taking her dose accurately or not. Has 

psych hx of Schizophrenia, recently having delusions. INR was reversed with 

KCentra and Vit K and now is 1. Her hematuria is resolved, BUN/Cr slowly 

normalizing. Yesterday her stools are somewhat dark red in color but this AM 

had normal brown stool witnessed by RN. Her hemoccult yesterday was positive. 

Her Hgb trended down from 10 on admission to 7 in last few days. CT abd/pelvis w

/ evidence of distended ureter and infiltrative changes in perinephretic 

fat.She denies any abd pain, n/v. She had hx of EGD and colonoscopy in [place] >

5 yrs ago denies any hx of PUD, IBD, colorectal ca. She does have hx of breast 

ca s/p bilateral mastectomy, XRT, chemo in 2005. She had hx of acid reflux, 

been on Aciphex but has stopped over a year.





Past Medical/Surgical History


Medical Problems:


(1) Abdominal pain


Status: Acute  





(2) Acute kidney injury


Status: Acute  





(3) Anemia


Status: Acute  





(4) Anticoagulated on warfarin


Status: Acute  





(5) Paranoia


Status: Acute  





(6) Precordial chest pain


Status: Acute  





(7) Renal hematuria


Status: Acute  





(8) Schizophrenia


Status: Acute  





(9) UTI (urinary tract infection)


Status: Acute  





(10) UTI (urinary tract infection)


Status: Acute  





Past Medical History:


Past Medical/Surgical History


Medical Problems:


Allergic asthma


PE


Chronic anticoagulation


GERD


Breast CA s/p bilateral mastectomy, chemo, XRT


Depression


Schizophrenia


Lymphedema


Past Surgical History:


Surgical Problems:


(1) S/P mastectomy, bilateral


(2) S/P [id number]


Cataract - bilateral


Bladder repair





Family History





Heart disease


Hypertension





Social History


Smoking Status:  Never Smoker


Alcohol Use:  none


Drug Use:  none


Marital Status:  single


Housing Status:  lives with family


Occupation Status:  retired





Allergies


Coded Allergies:  


     Celecoxib (Verified  Allergy, Severe, airway edema, 8/6/18)


     Acarides (Mites) (Verified  Allergy, Mild, unknown, 8/6/18)


     Cat Dander (Verified  Allergy, Mild, unknown, 8/6/18)


     Dog Dander (Verified  Allergy, Mild, unknown, 8/6/18)


     Dust (Verified  Allergy, Mild, unknown, 8/6/18)


     Grass (Verified  Allergy, Mild, unknown, 8/6/18)


     Molds & Smuts (Verified  Allergy, Mild, unknown, 8/6/18)


     Clindamycin (Verified  Allergy, Unknown, unknown, 8/6/18)


     Sulfa Antibiotics (Verified  Allergy, Unknown, ., 8/6/18)


     Amoxicillin (Verified  Adverse Reaction, Mild, abdominal pain, 8/6/18)


     Ibuprofen (Verified  Adverse Reaction, Unknown, unknown, 8/6/18)


     Rofecoxib (Verified  Adverse Reaction, Unknown, unknown, 8/6/18)





Current Medications





Home Meds and Scripts








 Medications  Dose


 Route/Sig


 Max Daily Dose Days Date Category Dose


Instructions


 


 Jantoven


  (Warfarin Sodium)


 7.5 Mg Tab  7.5 Mg


 PO 6XWK


    8/6/18 Reported  MON, TUES, WED, THURS, FRI, SAT


 


 Latuda


  (Lurasidone Hcl)


 60 Mg Tab  60 Mg


 PO DAILY


    8/6/18 Reported 


 


 Flonase Allergy


 Relief


  (Fluticasone


 Propionate


  (Nasal)) 50


 Mcg/Act Spr  2 Sprays


 KATIA QAM PRN


    6/6/18 Reported 


 


 Asmanex


 Twisthaler 120 Me


  (Mometasone


 Furoate


  (Inhalation) 220


 Mcg/Inh Aer  1 Puff


 INH DAILY PRN


    6/6/18 Reported 


 


 Ventolin Hfa


  (Albuterol) 200


 Puffs/12962 Mcg


 Aers  2 Puffs


 INH Q6H PRN


    6/6/18 Reported 


 


 Vitamin D3


  (Cholecalciferol)


 1,000 Unit Tab  1 Tab


 PO DAILY


   30 4/14/17 Reported 


 


 Warfarin Sodium 5


 Mg Tab  5 Mg


 PO WK


    5/11/16 Reported  SUNDAYS


 


 Lipitor


  (Atorvastatin


 Calcium) 20 Mg Tab  10 Mg


 PO DAILY


    5/11/16 Reported 


 


 Loratadine-D 12HR


  (Loratadine &


 Pseudoephedrine)


 1 Tab Tab  10 Mg


 PO QAM PRN


    5/19/15 Reported 


 


 Aciphex


  (Rabeprazole


 Sodium) 20 Mg


 Tabcr  20 Mg


 PO QAM PRN


    1/28/15 Reported 


 


 Systane


  (Polyethylene


 Glycol-Propylene)


 1 Sol Sol  2 Drops


 OPB BID PRN


    1/28/15 Reported 











Review of Systems


Constitutional:  No fever, No chills, No weight loss, No weakness


Respiratory:  No cough, No shortness of breath


Cardiac:  No chest pain


Abdomen:  + see HPI, + GI bleeding, No pain, No nausea, No vomiting


Female :  + hematuria (resolved)


Skin:  No rash, No itch, No jaundice





Physical Exam











  Date Time  Temp Pulse Resp B/P (MAP) Pulse Ox O2 Delivery O2 Flow Rate FiO2


 


8/10/18 08:53      Room Air  


 


8/10/18 07:23 36.8 74 18 108/71 (83) 99 Room Air  


 


8/10/18 00:00      Room Air  


 


8/9/18 23:58 36.9 84 18 115/68 (84) 100 Room Air  


 


8/9/18 16:00      Room Air  


 


8/9/18 15:16 36.9 76 18 101/68 (79) 99 Room Air  








General Appearance:  WD/WN, no apparent distress


Eyes:  normal inspection, PERRL, EOMI


Neck:  supple, no JVD, trachea midline


Respiratory/Chest:  normal breath sounds, no respiratory distress, no accessory 

muscle use


Cardiovascular:  regular rate, rhythm, no gallop, no murmur


Abdomen:  normal bowel sounds, non tender, soft


Extremities:  normal inspection, no pedal edema, no calf tenderness


Neurologic/Psych:  alert, normal mood/affect, oriented x 3


Skin:  normal color, no jaundice, no rash





Laboratory Results





Last 24 Hours








Test


  8/9/18


13:07 8/10/18


05:52 8/10/18


08:29


 


Hemoglobin 8.2 g/dL  7.2 g/dL  


 


White Blood Count  4.11 K/uL  


 


Red Blood Count  2.48 M/uL  


 


Hematocrit  22.0 %  


 


Mean Corpuscular Volume  88.7 fL  


 


Mean Corpuscular Hemoglobin  29.0 pg  


 


Mean Corpuscular Hemoglobin


Concent 


  32.7 g/dl 


  


 


 


Platelet Count  248 K/uL  


 


Mean Platelet Volume  10.0 fL  


 


Neutrophils (%) (Auto)  57.2 %  


 


Lymphocytes (%) (Auto)  31.4 %  


 


Monocytes (%) (Auto)  5.6 %  


 


Eosinophils (%) (Auto)  5.1 %  


 


Basophils (%) (Auto)  0.5 %  


 


Neutrophils # (Auto)  2.35 K/uL  


 


Lymphocytes # (Auto)  1.29 K/uL  


 


Monocytes # (Auto)  0.23 K/uL  


 


Eosinophils # (Auto)  0.21 K/uL  


 


Basophils # (Auto)  0.02 K/uL  


 


RDW Standard Deviation  45.2 fL  


 


RDW Coefficient of Variation  13.9 %  


 


Immature Granulocyte % (Auto)  0.2 %  


 


Immature Granulocyte # (Auto)  0.01 K/uL  


 


Red Blood Cell Morphology  Unremarkable  


 


Sodium Level  144 mmol/L  


 


Potassium Level  4.2 mmol/L  


 


Chloride Level  112 mmol/L  


 


Carbon Dioxide Level  24 mmol/L  


 


Anion Gap  8.0 mmol/L  


 


Blood Urea Nitrogen  19 mg/dl  


 


Creatinine  1.21 mg/dl  


 


Est Creatinine Clear Calc


Drug Dose 


  52.5 ml/min 


  


 


 


Estimated GFR (


American) 


  54.8 


  


 


 


Estimated GFR (Non-


American 


  47.2 


  


 


 


BUN/Creatinine Ratio  15.3  


 


Random Glucose  83 mg/dl  


 


Calcium Level  7.9 mg/dl  


 


Prothrombin Time   12.0 SECONDS 


 


Prothromb Time International


Ratio 


  


  1.1 


 











Impression


Patient is a 62 year old female w hematuria, dark stools for days prior to 

admission. Last dark red BM yesterday, hemoccult was positiv. She was found to 

have supratherapeutic INR >10 on admission. She had been on Warfarin for 

multiple PEs. Received K Centra and Vit K, INR reversed to 1. Her blood ct did 

drop but hasn't significantly changed in last couple of days. CT w/ signs of 

bleeding in kidneys but no signs of colitis or intraperitoneal bleeding. Her 

hematuria has stopped and stools are brown in color this AM.





Plan


- Pt ate solid breakfast this AM thus cannot perform non emergent endoscopic 

evaluations. We discussed case with the primary team and decided that given 

questionable compliance due to pt's psych history and risk of her not following 

up after discharge, we should perform EGD and colonoscopy on Monday. We have 

discussed procedures indications to pt as well and she is agreeable to this. I 

have spoken with the Psych team (CATALINA Olvera) who reviewed Dr. Frazier's notes - 

pt should be competent to consent for her EGD/Colonoscopy procedures. Please 

keep her at CL diet on Sunday, NPO after midnight afterwards. Bowel prep has 

been written to start on Sunday 8/12 1700. 





- Increase Protonix 40mg IV BID





- May benefit from 1U PRBC today, then monitor H/H, transfuse more prn. 





Attending attestation





I have seen, examined this patient, and agree with the findings and above by 

our mid-level provider RADHA Saezn.


-No signs of acute bleeding, brown stool this morn.


-Given psychiatric issues and need for anticoagulation, plan on EGD/Colon on 

Monday


-Recall this weekend if any change in symptoms this weekend


-Obviously, hold anticoagulation


-BID PPI

## 2018-08-11 VITALS
SYSTOLIC BLOOD PRESSURE: 120 MMHG | OXYGEN SATURATION: 96 % | HEART RATE: 74 BPM | DIASTOLIC BLOOD PRESSURE: 74 MMHG | TEMPERATURE: 98.6 F

## 2018-08-11 VITALS
TEMPERATURE: 98.24 F | HEART RATE: 75 BPM | SYSTOLIC BLOOD PRESSURE: 120 MMHG | OXYGEN SATURATION: 100 % | DIASTOLIC BLOOD PRESSURE: 70 MMHG

## 2018-08-11 VITALS
SYSTOLIC BLOOD PRESSURE: 107 MMHG | TEMPERATURE: 98.06 F | DIASTOLIC BLOOD PRESSURE: 67 MMHG | OXYGEN SATURATION: 99 % | HEART RATE: 79 BPM

## 2018-08-11 VITALS
DIASTOLIC BLOOD PRESSURE: 80 MMHG | TEMPERATURE: 98.06 F | OXYGEN SATURATION: 99 % | HEART RATE: 68 BPM | SYSTOLIC BLOOD PRESSURE: 128 MMHG

## 2018-08-11 VITALS
HEART RATE: 72 BPM | DIASTOLIC BLOOD PRESSURE: 68 MMHG | OXYGEN SATURATION: 99 % | TEMPERATURE: 98.6 F | SYSTOLIC BLOOD PRESSURE: 120 MMHG

## 2018-08-11 LAB
BASOPHILS # BLD: 0.01 K/UL (ref 0–0.2)
BASOPHILS NFR BLD: 0.2 %
EOS ABS #: 0.3 K/UL (ref 0–0.5)
EOSINOPHIL NFR BLD AUTO: 253 K/UL (ref 130–400)
HCT VFR BLD CALC: 22.4 % (ref 37–47)
HGB BLD-MCNC: 7.3 G/DL (ref 12–16)
IG#: 0.01 K/UL (ref 0–0.02)
IMM GRANULOCYTES NFR BLD AUTO: 25.2 %
LYMPHOCYTES # BLD: 1.19 K/UL (ref 1.2–3.4)
MCH RBC QN AUTO: 29.1 PG (ref 25–34)
MCHC RBC AUTO-ENTMCNC: 32.6 G/DL (ref 32–36)
MCV RBC AUTO: 89.2 FL (ref 80–100)
MONO ABS #: 0.26 K/UL (ref 0.11–0.59)
MONOCYTES NFR BLD: 5.5 %
NEUT ABS #: 2.96 K/UL (ref 1.4–6.5)
NEUTROPHILS # BLD AUTO: 6.3 %
NEUTROPHILS NFR BLD AUTO: 62.6 %
PMV BLD AUTO: 10 FL (ref 7.4–10.4)
RED CELL DISTRIBUTION WIDTH CV: 14 % (ref 11.5–14.5)
RED CELL DISTRIBUTION WIDTH SD: 45.9 FL (ref 36.4–46.3)
WBC # BLD AUTO: 4.73 K/UL (ref 4.8–10.8)

## 2018-08-11 RX ADMIN — Medication SCH INTER.UNIT: at 09:08

## 2018-08-11 RX ADMIN — ATORVASTATIN CALCIUM SCH MG: 10 TABLET, FILM COATED ORAL at 09:08

## 2018-08-11 RX ADMIN — PANTOPRAZOLE SCH MG: 40 TABLET, DELAYED RELEASE ORAL at 20:33

## 2018-08-11 RX ADMIN — LURASIDONE HYDROCHLORIDE SCH MG: 40 TABLET, FILM COATED ORAL at 09:08

## 2018-08-11 RX ADMIN — PANTOPRAZOLE SODIUM SCH MLS/MIN: 40 INJECTION, POWDER, FOR SOLUTION INTRAVENOUS at 09:07

## 2018-08-11 NOTE — FAMILY MEDICINE PROGRESS NOTE
Progress Note


Date of Service


Aug 11, 2018.





Subjective


Pt evaluation today including:  conversation w/ patient


Pain:  None


Voiding:  no voiding problems


Patient seen and evaluated today.  Had BM this morning that was not bloody and 

not black.  No complaints of bleeding issues such as hematuria, bleeding from 

gums, black/bloody stools, dizziness/lightheadedness.  No fevers or chills or 

acute complaints.  No acute events overnight.





   Constitutional:  No fever, No chills


   Eyes:  No eye pain, No discharge


   ENT:  No unusual epistaxis, No sore throat


   Respiratory:  No cough, No sputum, No shortness of breath, No hemoptysis


   Cardiovascular:  No chest pain, No edema, No palpitations


   Abdomen:  No pain, No nausea, No vomiting, No GI bleeding


   Musculoskeletal:  No joint pain, No muscle pain, No calf pain


   Female :  No hematuria


   Neurologic:  No weakness, No numbness/tingling


   Heme:  + abnormal bleeding/bruising (bruising on legs that is not new 

overnight)


   Skin:  No bleeding


   All Other Systems:  Reviewed and Negative





Medications





Current Inpatient Medications








 Medications


  (Trade)  Dose


 Ordered  Sig/Angely


 Route  Start Time


 Stop Time Status Last Admin


Dose Admin


 


 Albuterol


  (Ventolin Hfa


 Inhaler)  2 puffs  Q6H  PRN


 INH  8/6/18 17:45


 9/5/18 17:44   


 


 


 Atorvastatin


 Calcium


  (Lipitor Tab)  10 mg  DAILY


 PO  8/7/18 09:00


 9/6/18 08:59  8/11/18 09:08


10 MG


 


 Cholecalciferol


  (Vitamin D Tab)  1,000


 inter.unit  DAILY


 PO  8/7/18 09:00


 9/6/18 08:59  8/11/18 09:08


1,000 INTER.UNIT


 


 Lurasidone HCl


  (Latuda Tab)  60 mg  DAILY


 PO  8/7/18 09:00


 9/6/18 08:59  8/11/18 09:08


60 MG


 


 Heparin Sodium


  (Porcine)


  (Heparin Sq 5000


 Unit/0.5ml)  5,000 unit  Q12


 SQ  8/7/18 21:00


 9/6/18 20:59 Future Hold 8/9/18 08:00


5,000 UNIT


 


 Polyethylene


  (Miralax Powder


 Packet)  119 gm  ONE  ONCE


 PO  8/12/18 17:00


 8/12/18 17:01   


 


 


 Polyethylene


  (Miralax Powder


 Packet)  119 gm  ONE  ONCE


 PO  8/12/18 21:00


 8/12/18 21:01   


 


 


 Bisacodyl


  (Dulcolax Tab)  20 mg  ONE  ONCE


 PO  8/12/18 17:00


 8/12/18 17:01   


 


 


 Pantoprazole


 Sodium


  (Protonix Tab)  40 mg  BID


 PO  8/11/18 20:00


 9/10/18 19:59   


 











Objective


Vital Signs











  Date Time  Temp Pulse Resp B/P (MAP) Pulse Ox O2 Delivery O2 Flow Rate FiO2


 


8/11/18 15:23 36.8 75 18 120/70 (87) 100 Room Air  


 


8/11/18 11:52 37.0 72 20 120/68 (85) 99 Room Air  


 


8/11/18 08:02 36.7 68 19 128/80 (96) 99 Room Air  


 


8/11/18 00:30      Room Air  


 


8/11/18 00:18 36.7 79 15 107/67 (80) 99 Room Air  











Physical Exam


General Appearance:  WD/WN


Eyes:  normal inspection, EOMI, sclerae normal


ENT:  normal ENT inspection, hearing grossly normal


Neck:  supple, no JVD, trachea midline


Respiratory/Chest:  lungs clear, normal breath sounds, no respiratory distress, 

no accessory muscle use


Cardiovascular:  regular rate, rhythm, no gallop, no JVD, no murmur


Abdomen:  normal bowel sounds, non tender, soft


Extremities:  non-tender, + pertinent finding (multiple ecchymosis in stages of 

healing on legs)


Neurologic/Psychiatric:  alert, normal mood/affect, oriented x 3


Skin:  warm/dry





Laboratory Results


8/11/18 05:44








Red Blood Count 2.51, Mean Corpuscular Volume 89.2, Mean Corpuscular Hemoglobin 

29.1, Mean Corpuscular Hemoglobin Concent 32.6, Mean Platelet Volume 10.0, 

Neutrophils (%) (Auto) 62.6, Lymphocytes (%) (Auto) 25.2, Monocytes (%) (Auto) 

5.5, Eosinophils (%) (Auto) 6.3, Basophils (%) (Auto) 0.2, Neutrophils # (Auto) 

2.96, Lymphocytes # (Auto) 1.19, Monocytes # (Auto) 0.26, Eosinophils # (Auto) 

0.30, Basophils # (Auto) 0.01














Test


  8/11/18


05:44


 


White Blood Count


  4.73 K/uL


(4.8-10.8)


 


Red Blood Count


  2.51 M/uL


(4.2-5.4)


 


Hemoglobin


  7.3 g/dL


(12.0-16.0)


 


Hematocrit 22.4 % (37-47) 


 


Mean Corpuscular Volume


  89.2 fL


()


 


Mean Corpuscular Hemoglobin


  29.1 pg


(25-34)


 


Mean Corpuscular Hemoglobin


Concent 32.6 g/dl


(32-36)


 


Platelet Count


  253 K/uL


(130-400)


 


Mean Platelet Volume


  10.0 fL


(7.4-10.4)


 


Neutrophils (%) (Auto) 62.6 % 


 


Lymphocytes (%) (Auto) 25.2 % 


 


Monocytes (%) (Auto) 5.5 % 


 


Eosinophils (%) (Auto) 6.3 % 


 


Basophils (%) (Auto) 0.2 % 


 


Neutrophils # (Auto)


  2.96 K/uL


(1.4-6.5)


 


Lymphocytes # (Auto)


  1.19 K/uL


(1.2-3.4)


 


Monocytes # (Auto)


  0.26 K/uL


(0.11-0.59)


 


Eosinophils # (Auto)


  0.30 K/uL


(0-0.5)


 


Basophils # (Auto)


  0.01 K/uL


(0-0.2)


 


RDW Standard Deviation


  45.9 fL


(36.4-46.3)


 


RDW Coefficient of Variation


  14.0 %


(11.5-14.5)


 


Immature Granulocyte % (Auto) 0.2 % 


 


Immature Granulocyte # (Auto)


  0.01 K/uL


(0.00-0.02)


 


Red Blood Cell Morphology Unremarkable 











Assessment and Plan


Supratherapeutic INR


-Corrected with PCC four factor and vitamin K


-stable 1.1 yesterday


-Continuing to hold warfarin with plan to restart tomorrow





DAVID 2/2 Renal hemorrhage


-Renal function greatly improving


-Creatinine at 1.21 down from 5.49


-UA ordered to look for hematuria


-Will order U/S Kidney tomorrow for follow up of resolving bleeding 

complications





Acute blood loss anemia


-Hemoglobin of 7.3 today which is stable and improved from 7.2 yesterday


-Will continue to monitor daily


-GI consulted planning for scope on Monday, will bowel prep tomorrow and make 

NPO after midnight tomorrow.


-PPI switched from IV to PO with same dosage BID.





Depression/Schizophrenia


-Possible overdose was intentional, psych consulted on patient


-Patient filled about 360 pills in last several weeks.


-Intentional vs psychogenic vs accidental overdose.  Patient states today that 

she does not know why she became supratherapeutic.  Denies self-harm.





Asthma


-Stable continue to monitor.





DVT PPx


-Holding anti-coag because of blood loss; will continue to monitor patient for 

any signs and symptoms

## 2018-08-12 VITALS
OXYGEN SATURATION: 98 % | HEART RATE: 71 BPM | SYSTOLIC BLOOD PRESSURE: 110 MMHG | TEMPERATURE: 98.42 F | DIASTOLIC BLOOD PRESSURE: 72 MMHG

## 2018-08-12 VITALS
OXYGEN SATURATION: 99 % | HEART RATE: 79 BPM | DIASTOLIC BLOOD PRESSURE: 82 MMHG | TEMPERATURE: 98.24 F | SYSTOLIC BLOOD PRESSURE: 134 MMHG

## 2018-08-12 VITALS — OXYGEN SATURATION: 98 %

## 2018-08-12 VITALS
TEMPERATURE: 98.42 F | OXYGEN SATURATION: 98 % | SYSTOLIC BLOOD PRESSURE: 127 MMHG | DIASTOLIC BLOOD PRESSURE: 77 MMHG | HEART RATE: 75 BPM

## 2018-08-12 LAB
BASOPHILS # BLD: 0.01 K/UL (ref 0–0.2)
BASOPHILS NFR BLD: 0.2 %
BUN SERPL-MCNC: 18 MG/DL (ref 7–18)
CALCIUM SERPL-MCNC: 8.3 MG/DL (ref 8.5–10.1)
CO2 SERPL-SCNC: 25 MMOL/L (ref 21–32)
CREAT SERPL-MCNC: 1.21 MG/DL (ref 0.6–1.2)
EOS ABS #: 0.27 K/UL (ref 0–0.5)
EOSINOPHIL NFR BLD AUTO: 306 K/UL (ref 130–400)
GLUCOSE SERPL-MCNC: 83 MG/DL (ref 70–99)
HCT VFR BLD CALC: 23.6 % (ref 37–47)
HGB BLD-MCNC: 7.8 G/DL (ref 12–16)
IG#: 0.02 K/UL (ref 0–0.02)
IMM GRANULOCYTES NFR BLD AUTO: 21.9 %
INR PPP: 1.1 (ref 0.9–1.1)
LYMPHOCYTES # BLD: 1.14 K/UL (ref 1.2–3.4)
MCH RBC QN AUTO: 29.4 PG (ref 25–34)
MCHC RBC AUTO-ENTMCNC: 33.1 G/DL (ref 32–36)
MCV RBC AUTO: 89.1 FL (ref 80–100)
MONO ABS #: 0.27 K/UL (ref 0.11–0.59)
MONOCYTES NFR BLD: 5.2 %
NEUT ABS #: 3.49 K/UL (ref 1.4–6.5)
NEUTROPHILS # BLD AUTO: 5.2 %
NEUTROPHILS NFR BLD AUTO: 67.1 %
PMV BLD AUTO: 9.9 FL (ref 7.4–10.4)
POTASSIUM SERPL-SCNC: 3.7 MMOL/L (ref 3.5–5.1)
RED CELL DISTRIBUTION WIDTH CV: 13.9 % (ref 11.5–14.5)
RED CELL DISTRIBUTION WIDTH SD: 45.3 FL (ref 36.4–46.3)
SODIUM SERPL-SCNC: 141 MMOL/L (ref 136–145)
WBC # BLD AUTO: 5.2 K/UL (ref 4.8–10.8)

## 2018-08-12 RX ADMIN — ATORVASTATIN CALCIUM SCH MG: 10 TABLET, FILM COATED ORAL at 07:47

## 2018-08-12 RX ADMIN — LURASIDONE HYDROCHLORIDE SCH MG: 40 TABLET, FILM COATED ORAL at 07:46

## 2018-08-12 RX ADMIN — PANTOPRAZOLE SCH MG: 40 TABLET, DELAYED RELEASE ORAL at 07:45

## 2018-08-12 RX ADMIN — PANTOPRAZOLE SCH MG: 40 TABLET, DELAYED RELEASE ORAL at 20:34

## 2018-08-12 RX ADMIN — Medication SCH INTER.UNIT: at 07:47

## 2018-08-12 NOTE — GASTROENTEROLOGY PROGRESS NOTE
Progress Note


Date of Service:  Aug 12, 2018


Subjective


Pt evaluation today including:  conversation w/ patient, conversation w/ family


Doing well no evidence of any further rectal bleeding or melena.





Medications





Current Inpatient Medications








 Medications


  (Trade)  Dose


 Ordered  Sig/Angely


 Route  Start Time


 Stop Time Status Last Admin


Dose Admin


 


 Albuterol


  (Ventolin Hfa


 Inhaler)  2 puffs  Q6H  PRN


 INH  8/6/18 17:45


 9/5/18 17:44   


 


 


 Atorvastatin


 Calcium


  (Lipitor Tab)  10 mg  DAILY


 PO  8/7/18 09:00


 9/6/18 08:59  8/12/18 07:47


10 MG


 


 Cholecalciferol


  (Vitamin D Tab)  1,000


 inter.unit  DAILY


 PO  8/7/18 09:00


 9/6/18 08:59  8/12/18 07:47


1,000 INTER.UNIT


 


 Lurasidone HCl


  (Latuda Tab)  60 mg  DAILY


 PO  8/7/18 09:00


 9/6/18 08:59  8/12/18 07:46


60 MG


 


 Heparin Sodium


  (Porcine)


  (Heparin Sq 5000


 Unit/0.5ml)  5,000 unit  Q12


 SQ  8/7/18 21:00


 9/6/18 20:59 Future Hold 8/9/18 08:00


5,000 UNIT


 


 Polyethylene


  (Miralax Powder


 Packet)  119 gm  ONE  ONCE


 PO  8/12/18 17:00


 8/12/18 17:01   


 


 


 Polyethylene


  (Miralax Powder


 Packet)  119 gm  ONE  ONCE


 PO  8/12/18 21:00


 8/12/18 21:01   


 


 


 Bisacodyl


  (Dulcolax Tab)  20 mg  ONE  ONCE


 PO  8/12/18 17:00


 8/12/18 17:01   


 


 


 Pantoprazole


 Sodium


  (Protonix Tab)  40 mg  BID


 PO  8/11/18 20:00


 9/10/18 19:59  8/12/18 07:45


40 MG











Objective


Vital Signs











  Date Time  Temp Pulse Resp B/P (MAP) Pulse Ox O2 Delivery O2 Flow Rate FiO2


 


8/12/18 08:10 36.9 71 16 110/72 (85) 98   


 


8/11/18 23:22 37.0 74 18 120/74 (89) 96 Room Air  


 


8/11/18 20:30      Room Air  


 


8/11/18 15:23 36.8 75 18 120/70 (87) 100 Room Air  


 


8/11/18 11:52 37.0 72 20 120/68 (85) 99 Room Air  











Physical Exam


General Appearance:  WD/WN, no apparent distress


Eyes:  normal inspection


ENT:  normal ENT inspection


Respiratory/Chest:  chest non-tender, lungs clear, + respiratory distress


Cardiovascular:  no edema


Abdomen:  normal bowel sounds, non tender


Extremities:  normal range of motion, non-tender


Neurologic/Psych:  CNs II-XII nml as tested





Laboratory Results





Last 24 Hours








Test


  8/11/18


18:45 8/12/18


07:06


 


Urine Color YELLOW  


 


Urine Appearance CLEAR  


 


Urine pH 5.0  


 


Urine Specific Gravity 1.011  


 


Urine Protein NEG  


 


Urine Glucose (UA) NEG  


 


Urine Ketones NEG  


 


Urine Occult Blood TRACE  


 


Urine Nitrite NEG  


 


Urine Bilirubin NEG  


 


Urine Urobilinogen NEG  


 


Urine Leukocyte Esterase SMALL  


 


Urine WBC (Auto) 1-5 /hpf  


 


Urine RBC (Auto) 0-4 /hpf  


 


Urine Hyaline Casts (Auto) 1-5 /lpf  


 


Urine Epithelial Cells (Auto) 0-5 /lpf  


 


Urine Bacteria (Auto) NEG  


 


White Blood Count  5.20 K/uL 


 


Red Blood Count  2.65 M/uL 


 


Hemoglobin  7.8 g/dL 


 


Hematocrit  23.6 % 


 


Mean Corpuscular Volume  89.1 fL 


 


Mean Corpuscular Hemoglobin  29.4 pg 


 


Mean Corpuscular Hemoglobin


Concent 


  33.1 g/dl 


 


 


Platelet Count  306 K/uL 


 


Mean Platelet Volume  9.9 fL 


 


Neutrophils (%) (Auto)  67.1 % 


 


Lymphocytes (%) (Auto)  21.9 % 


 


Monocytes (%) (Auto)  5.2 % 


 


Eosinophils (%) (Auto)  5.2 % 


 


Basophils (%) (Auto)  0.2 % 


 


Neutrophils # (Auto)  3.49 K/uL 


 


Lymphocytes # (Auto)  1.14 K/uL 


 


Monocytes # (Auto)  0.27 K/uL 


 


Eosinophils # (Auto)  0.27 K/uL 


 


Basophils # (Auto)  0.01 K/uL 


 


RDW Standard Deviation  45.3 fL 


 


RDW Coefficient of Variation  13.9 % 


 


Immature Granulocyte % (Auto)  0.4 % 


 


Immature Granulocyte # (Auto)  0.02 K/uL 


 


Red Blood Cell Morphology  Unremarkable 


 


Prothrombin Time  11.4 SECONDS 


 


Prothromb Time International


Ratio 


  1.1 


 


 


Sodium Level  141 mmol/L 


 


Potassium Level  3.7 mmol/L 


 


Chloride Level  108 mmol/L 


 


Carbon Dioxide Level  25 mmol/L 


 


Anion Gap  8.0 mmol/L 


 


Blood Urea Nitrogen  18 mg/dl 


 


Creatinine  1.21 mg/dl 


 


Est Creatinine Clear Calc


Drug Dose 


  52.5 ml/min 


 


 


Estimated GFR (


American) 


  54.8 


 


 


Estimated GFR (Non-


American 


  47.2 


 


 


BUN/Creatinine Ratio  14.9 


 


Random Glucose  83 mg/dl 


 


Calcium Level  8.3 mg/dl 











Assessment and Plan


Patient is a 62 year old female w hematuria, dark stools for days prior to 

admission. Last dark red BM Thursday, hemoccult was positive. She was found to 

have supratherapeutic INR >10 on admission. She had been on Warfarin for 

multiple PEs. Received K Centra and Vit K, INR reversed to 1. Her blood ct did 

drop but hasn't significantly changed in last couple of days. CT w/ signs of 

bleeding in kidneys but no signs of colitis or intraperitoneal bleeding. Her 

hematuria has stopped and stools are brown in color this AM.





Plan


- The Psych team (CATALINA Olvera) who reviewed Dr. Frazier's notes - pt should be 

competent to consent for her EGD/Colonoscopy procedures. Please keep her at [facility] 

diet on Sunday, NPO after midnight afterwards. Bowel prep has been written to 

start on Sunday 8/12 1700. 


- Increase Protonix 40mg IV BID can be changed to PO


-Transfusion per Primary team.

## 2018-08-12 NOTE — FAMILY MEDICINE PROGRESS NOTE
Progress Note


Date of Service


Aug 12, 2018.





Subjective


Pt evaluation today including:  conversation w/ patient, physical exam, chart 

review, lab review, review of studies


Pain:  No complaints of pain


PO Intake:  PO fluids, no issues


Voiding:  no voiding problems


Patient was seen and evaluated.  She is seeing psych nurses and her mood is 

stable.  She has no acute complaints.  She has not noticed any additional black/

bloody stools or had nose bleeds, gum bleeding, hemoptysis.  She denies chest 

pain, shortness of breath, dizziness.  All questions and concerns were 

addressed.





   Constitutional:  No fever, No chills


   Eyes:  No worsening of vision


   ENT:  No nasal symptoms, No sore throat


   Respiratory:  No cough, No sputum, No wheezing, No shortness of breath, No 

hemoptysis


   Cardiovascular:  No chest pain, No edema


   Abdomen:  No pain, No nausea, No vomiting, No diarrhea, No GI bleeding


   Musculoskeletal:  No joint pain, No muscle pain


   Female :  No dysuria, No urinary frequency, No hematuria


   Neurologic:  No weakness, No numbness/tingling


   Psychiatric:  + see HPI


   Endo:  No fatigue


   All Other Systems:  Reviewed and Negative





Medications





Current Inpatient Medications








 Medications


  (Trade)  Dose


 Ordered  Sig/Angely


 Route  Start Time


 Stop Time Status Last Admin


Dose Admin


 


 Albuterol


  (Ventolin Hfa


 Inhaler)  2 puffs  Q6H  PRN


 INH  8/6/18 17:45


 9/5/18 17:44   


 


 


 Atorvastatin


 Calcium


  (Lipitor Tab)  10 mg  DAILY


 PO  8/7/18 09:00


 9/6/18 08:59  8/12/18 07:47


10 MG


 


 Cholecalciferol


  (Vitamin D Tab)  1,000


 inter.unit  DAILY


 PO  8/7/18 09:00


 9/6/18 08:59  8/12/18 07:47


1,000 INTER.UNIT


 


 Lurasidone HCl


  (Latuda Tab)  60 mg  DAILY


 PO  8/7/18 09:00


 9/6/18 08:59  8/12/18 07:46


60 MG


 


 Heparin Sodium


  (Porcine)


  (Heparin Sq 5000


 Unit/0.5ml)  5,000 unit  Q12


 SQ  8/7/18 21:00


 9/6/18 20:59 Future Hold 8/9/18 08:00


5,000 UNIT


 


 Polyethylene


  (Miralax Powder


 Packet)  119 gm  ONE  ONCE


 PO  8/12/18 17:00


 8/12/18 17:01   


 


 


 Polyethylene


  (Miralax Powder


 Packet)  119 gm  ONE  ONCE


 PO  8/12/18 21:00


 8/12/18 21:01   


 


 


 Bisacodyl


  (Dulcolax Tab)  20 mg  ONE  ONCE


 PO  8/12/18 17:00


 8/12/18 17:01   


 


 


 Pantoprazole


 Sodium


  (Protonix Tab)  40 mg  BID


 PO  8/11/18 20:00


 9/10/18 19:59  8/12/18 07:45


40 MG











Objective


Vital Signs











  Date Time  Temp Pulse Resp B/P (MAP) Pulse Ox O2 Delivery O2 Flow Rate FiO2


 


8/12/18 15:24 36.9 75 18 127/77 (94) 98 Room Air  


 


8/12/18 08:10 36.9 71 16 110/72 (85) 98   


 


8/12/18 08:00      Room Air  


 


8/11/18 23:22 37.0 74 18 120/74 (89) 96 Room Air  


 


8/11/18 20:30      Room Air  











Physical Exam


General Appearance:  WD/WN, no apparent distress


Eyes:  normal inspection, EOMI, sclerae normal


ENT:  normal ENT inspection


Neck:  supple, no JVD, trachea midline


Respiratory/Chest:  lungs clear, normal breath sounds, no respiratory distress, 

no accessory muscle use


Cardiovascular:  regular rate, rhythm, no gallop, no murmur


Abdomen:  normal bowel sounds, non tender, soft


Extremities:  normal range of motion, + pertinent finding (healing ecchymosis 

diffusely on legs bilaterally)


Neurologic/Psychiatric:  alert, normal mood/affect, oriented x 3


Skin:  warm/dry





Laboratory Results


8/12/18 07:06








Red Blood Count 2.65, Mean Corpuscular Volume 89.1, Mean Corpuscular Hemoglobin 

29.4, Mean Corpuscular Hemoglobin Concent 33.1, Mean Platelet Volume 9.9, 

Neutrophils (%) (Auto) 67.1, Lymphocytes (%) (Auto) 21.9, Monocytes (%) (Auto) 

5.2, Eosinophils (%) (Auto) 5.2, Basophils (%) (Auto) 0.2, Neutrophils # (Auto) 

3.49, Lymphocytes # (Auto) 1.14, Monocytes # (Auto) 0.27, Eosinophils # (Auto) 

0.27, Basophils # (Auto) 0.01





8/12/18 07:06

















Test


  8/11/18


18:45 8/12/18


07:06


 


Urine Color YELLOW  


 


Urine Appearance CLEAR (CLEAR)  


 


Urine pH 5.0 (4.5-7.5)  


 


Urine Specific Gravity


  1.011


(1.000-1.030) 


 


 


Urine Protein NEG (NEG)  


 


Urine Glucose (UA) NEG (NEG)  


 


Urine Ketones NEG (NEG)  


 


Urine Occult Blood TRACE (NEG)  


 


Urine Nitrite NEG (NEG)  


 


Urine Bilirubin NEG (NEG)  


 


Urine Urobilinogen NEG (NEG)  


 


Urine Leukocyte Esterase SMALL (NEG)  


 


Urine WBC (Auto) 1-5 /hpf (0-5)  


 


Urine RBC (Auto) 0-4 /hpf (0-4)  


 


Urine Hyaline Casts (Auto) 1-5 /lpf (0-5)  


 


Urine Epithelial Cells (Auto) 0-5 /lpf (0-5)  


 


Urine Bacteria (Auto) NEG (NEG)  


 


White Blood Count


  


  5.20 K/uL


(4.8-10.8)


 


Red Blood Count


  


  2.65 M/uL


(4.2-5.4)


 


Hemoglobin


  


  7.8 g/dL


(12.0-16.0)


 


Hematocrit  23.6 % (37-47) 


 


Mean Corpuscular Volume


  


  89.1 fL


()


 


Mean Corpuscular Hemoglobin


  


  29.4 pg


(25-34)


 


Mean Corpuscular Hemoglobin


Concent 


  33.1 g/dl


(32-36)


 


Platelet Count


  


  306 K/uL


(130-400)


 


Mean Platelet Volume


  


  9.9 fL


(7.4-10.4)


 


Neutrophils (%) (Auto)  67.1 % 


 


Lymphocytes (%) (Auto)  21.9 % 


 


Monocytes (%) (Auto)  5.2 % 


 


Eosinophils (%) (Auto)  5.2 % 


 


Basophils (%) (Auto)  0.2 % 


 


Neutrophils # (Auto)


  


  3.49 K/uL


(1.4-6.5)


 


Lymphocytes # (Auto)


  


  1.14 K/uL


(1.2-3.4)


 


Monocytes # (Auto)


  


  0.27 K/uL


(0.11-0.59)


 


Eosinophils # (Auto)


  


  0.27 K/uL


(0-0.5)


 


Basophils # (Auto)


  


  0.01 K/uL


(0-0.2)


 


RDW Standard Deviation


  


  45.3 fL


(36.4-46.3)


 


RDW Coefficient of Variation


  


  13.9 %


(11.5-14.5)


 


Immature Granulocyte % (Auto)  0.4 % 


 


Immature Granulocyte # (Auto)


  


  0.02 K/uL


(0.00-0.02)


 


Red Blood Cell Morphology  Unremarkable 


 


Prothrombin Time


  


  11.4 SECONDS


(9.0-12.0)


 


Prothromb Time International


Ratio 


  1.1 (0.9-1.1) 


 


 


Anion Gap


  


  8.0 mmol/L


(3-11)


 


Est Creatinine Clear Calc


Drug Dose 


  52.5 ml/min 


 


 


Estimated GFR (


American) 


  54.8 


 


 


Estimated GFR (Non-


American 


  47.2 


 


 


BUN/Creatinine Ratio  14.9 (10-20) 


 


Calcium Level


  


  8.3 mg/dl


(8.5-10.1)











Assessment and Plan





Supratherapeutic INR


-Corrected with PCC four factor and vitamin K


-stable 1.1 today


-Continuing to hold warfarin with plan to restart tomorrow after GI endoscopy/

colonscopy





DAVID 2/2 Renal hemorrhage


-Renal function greatly improving


-Creatinine at 1.21 down from 5.49


-UA ordered to look for hematuria


-U/S Kidney Impression: Bilateral hydronephrosis and dilatation of the proximal 

ureters, right


greater than left. This is either unchanged or slightly improved since CT of


August 6, 2018.  And Marked right renal atrophy.





Acute blood loss anemia


-Hemoglobin of 7.8 today which is stable and improved from 7.2 yesterday


-Will continue to monitor daily


-GI consulted planning for scope tomorrow, bowel prep today and NPO after 

midnight tonight


-PPI on board.





Depression/Schizophrenia


-Possible overdose was intentional, psych consulted on patient and is seeing 

patient


-Patient filled about 360 pills in last several weeks.


-Intentional vs psychogenic vs accidental overdose.  Patient states today that 

she does not know why she became supratherapeutic.  Denies self-harm.





Asthma


-Stable continue to monitor.





DVT PPx


-Holding anti-coag because of blood loss and procedure tomorrow; encouraged 

patient ambulation and will monitor for any symptoms.


Continued [facility] stay due to:  other (Need for GI scoping/procedures)

## 2018-08-13 VITALS
DIASTOLIC BLOOD PRESSURE: 74 MMHG | HEART RATE: 81 BPM | SYSTOLIC BLOOD PRESSURE: 111 MMHG | OXYGEN SATURATION: 100 % | TEMPERATURE: 98.24 F

## 2018-08-13 VITALS
DIASTOLIC BLOOD PRESSURE: 69 MMHG | SYSTOLIC BLOOD PRESSURE: 108 MMHG | OXYGEN SATURATION: 96 % | HEART RATE: 69 BPM | TEMPERATURE: 98.42 F

## 2018-08-13 VITALS
SYSTOLIC BLOOD PRESSURE: 120 MMHG | TEMPERATURE: 98.42 F | DIASTOLIC BLOOD PRESSURE: 77 MMHG | OXYGEN SATURATION: 96 % | HEART RATE: 72 BPM

## 2018-08-13 VITALS
TEMPERATURE: 98.6 F | OXYGEN SATURATION: 96 % | HEART RATE: 73 BPM | SYSTOLIC BLOOD PRESSURE: 135 MMHG | DIASTOLIC BLOOD PRESSURE: 78 MMHG

## 2018-08-13 LAB
BASOPHILS # BLD: 0.02 K/UL (ref 0–0.2)
BASOPHILS NFR BLD: 0.5 %
BUN SERPL-MCNC: 15 MG/DL (ref 7–18)
CALCIUM SERPL-MCNC: 8.5 MG/DL (ref 8.5–10.1)
CO2 SERPL-SCNC: 25 MMOL/L (ref 21–32)
CREAT SERPL-MCNC: 1.13 MG/DL (ref 0.6–1.2)
EOS ABS #: 0.22 K/UL (ref 0–0.5)
EOSINOPHIL NFR BLD AUTO: 354 K/UL (ref 130–400)
GLUCOSE SERPL-MCNC: 87 MG/DL (ref 70–99)
HCT VFR BLD CALC: 24.8 % (ref 37–47)
HGB BLD-MCNC: 8.2 G/DL (ref 12–16)
IG#: 0.03 K/UL (ref 0–0.02)
IMM GRANULOCYTES NFR BLD AUTO: 19.7 %
INR PPP: 1.2 (ref 0.9–1.1)
LYMPHOCYTES # BLD: 0.86 K/UL (ref 1.2–3.4)
MCH RBC QN AUTO: 29.6 PG (ref 25–34)
MCHC RBC AUTO-ENTMCNC: 33.1 G/DL (ref 32–36)
MCV RBC AUTO: 89.5 FL (ref 80–100)
MONO ABS #: 0.22 K/UL (ref 0.11–0.59)
MONOCYTES NFR BLD: 5 %
NEUT ABS #: 3.02 K/UL (ref 1.4–6.5)
NEUTROPHILS # BLD AUTO: 5 %
NEUTROPHILS NFR BLD AUTO: 69.1 %
PMV BLD AUTO: 9.7 FL (ref 7.4–10.4)
POTASSIUM SERPL-SCNC: 3.7 MMOL/L (ref 3.5–5.1)
RED CELL DISTRIBUTION WIDTH CV: 13.9 % (ref 11.5–14.5)
RED CELL DISTRIBUTION WIDTH SD: 45 FL (ref 36.4–46.3)
SODIUM SERPL-SCNC: 142 MMOL/L (ref 136–145)
WBC # BLD AUTO: 4.37 K/UL (ref 4.8–10.8)

## 2018-08-13 PROCEDURE — 0DJD8ZZ INSPECTION OF LOWER INTESTINAL TRACT, VIA NATURAL OR ARTIFICIAL OPENING ENDOSCOPIC: ICD-10-PCS | Performed by: INTERNAL MEDICINE

## 2018-08-13 PROCEDURE — 0DJ08ZZ INSPECTION OF UPPER INTESTINAL TRACT, VIA NATURAL OR ARTIFICIAL OPENING ENDOSCOPIC: ICD-10-PCS | Performed by: INTERNAL MEDICINE

## 2018-08-13 RX ADMIN — Medication SCH INTER.UNIT: at 07:51

## 2018-08-13 RX ADMIN — LURASIDONE HYDROCHLORIDE SCH MG: 40 TABLET, FILM COATED ORAL at 07:51

## 2018-08-13 RX ADMIN — PANTOPRAZOLE SCH MG: 40 TABLET, DELAYED RELEASE ORAL at 07:51

## 2018-08-13 RX ADMIN — ATORVASTATIN CALCIUM SCH MG: 10 TABLET, FILM COATED ORAL at 07:52

## 2018-08-13 RX ADMIN — PANTOPRAZOLE SCH MG: 40 TABLET, DELAYED RELEASE ORAL at 19:23

## 2018-08-13 NOTE — GASTROENTEROLOGY PROGRESS NOTE
Progress Note


Date of Service:  Aug 13, 2018


Subjective


Pt evaluation today including:  conversation w/ patient, physical exam, chart 

review, lab review, review of studies


62 year old female admitted with hematuria and dark, heme positive stool. On 

coumadin - coagulopathy - INR >10, anemia with Hgb around 7. INR has normalized 

and patient has had no further dark stools. Hgb 8.2 today. INR 1.2. Denies any 

abdominal pain, n/v. Took bowel prep and plan is for EGD/colonoscopy today. 

Stools are clear per patient. NPO since around 11pm last night.





Review of Systems


Constitutional:  No fever, No chills


Eyes:  No worsening of vision, No eye pain


ENT:  No hearing loss, No sore throat


Respiratory:  No cough, No shortness of breath


Cardiac:  No chest pain


Abdomen:  + see HPI


Musculoskeletal:  No joint pain


Female :  No dysuria


Neuro:  No memory loss


Psych:  No problem reported


Endo:  No excessive thirst, No excessive urination


Skin:  No rash, No itch





Medications





Current Inpatient Medications








 Medications


  (Trade)  Dose


 Ordered  Sig/Angely


 Route  Start Time


 Stop Time Status Last Admin


Dose Admin


 


 Albuterol


  (Ventolin Hfa


 Inhaler)  2 puffs  Q6H  PRN


 INH  8/6/18 17:45


 9/5/18 17:44   


 


 


 Atorvastatin


 Calcium


  (Lipitor Tab)  10 mg  DAILY


 PO  8/7/18 09:00


 9/6/18 08:59  8/13/18 07:52


10 MG


 


 Cholecalciferol


  (Vitamin D Tab)  1,000


 inter.unit  DAILY


 PO  8/7/18 09:00


 9/6/18 08:59  8/13/18 07:51


1,000 INTER.UNIT


 


 Lurasidone HCl


  (Latuda Tab)  60 mg  DAILY


 PO  8/7/18 09:00


 9/6/18 08:59  8/13/18 07:51


60 MG


 


 Heparin Sodium


  (Porcine)


  (Heparin Sq 5000


 Unit/0.5ml)  5,000 unit  Q12


 SQ  8/7/18 21:00


 9/6/18 20:59 Future Hold 8/9/18 08:00


5,000 UNIT


 


 Pantoprazole


 Sodium


  (Protonix Tab)  40 mg  BID


 PO  8/11/18 20:00


 9/10/18 19:59  8/13/18 07:51


40 MG











Objective


Vital Signs











  Date Time  Temp Pulse Resp B/P (MAP) Pulse Ox O2 Delivery O2 Flow Rate FiO2


 


8/13/18 07:39 36.9 69 16 108/69 (82) 96 Room Air  


 


8/13/18 00:00      Room Air  


 


8/12/18 22:43 36.8 79 16 134/82 (99) 99 Room Air  


 


8/12/18 16:00     98 Room Air  


 


8/12/18 15:24 36.9 75 18 127/77 (94) 98 Room Air  











Physical Exam


General Appearance:  no apparent distress


Eyes:  normal inspection


ENT:  hearing grossly normal


Neck:  supple


Respiratory/Chest:  lungs clear, normal breath sounds


Cardiovascular:  regular rate, rhythm


Abdomen:  normal bowel sounds, non tender, soft, no organomegaly, no pulsatile 

mass


Extremities:  no pedal edema


Neurologic/Psych:  alert, normal mood/affect


Skin:  normal color, warm/dry





Laboratory Results





Last 24 Hours








Test


  8/13/18


08:02


 


White Blood Count 4.37 K/uL 


 


Red Blood Count 2.77 M/uL 


 


Hemoglobin 8.2 g/dL 


 


Hematocrit 24.8 % 


 


Mean Corpuscular Volume 89.5 fL 


 


Mean Corpuscular Hemoglobin 29.6 pg 


 


Mean Corpuscular Hemoglobin


Concent 33.1 g/dl 


 


 


Platelet Count 354 K/uL 


 


Mean Platelet Volume 9.7 fL 


 


Neutrophils (%) (Auto) 69.1 % 


 


Lymphocytes (%) (Auto) 19.7 % 


 


Monocytes (%) (Auto) 5.0 % 


 


Eosinophils (%) (Auto) 5.0 % 


 


Basophils (%) (Auto) 0.5 % 


 


Neutrophils # (Auto) 3.02 K/uL 


 


Lymphocytes # (Auto) 0.86 K/uL 


 


Monocytes # (Auto) 0.22 K/uL 


 


Eosinophils # (Auto) 0.22 K/uL 


 


Basophils # (Auto) 0.02 K/uL 


 


RDW Standard Deviation 45.0 fL 


 


RDW Coefficient of Variation 13.9 % 


 


Immature Granulocyte % (Auto) 0.7 % 


 


Immature Granulocyte # (Auto) 0.03 K/uL 


 


Red Blood Cell Morphology Unremarkable 


 


Prothrombin Time 12.9 SECONDS 


 


Prothromb Time International


Ratio 1.2 


 


 


Sodium Level 142 mmol/L 


 


Potassium Level 3.7 mmol/L 


 


Chloride Level 109 mmol/L 


 


Carbon Dioxide Level 25 mmol/L 


 


Anion Gap 8.0 mmol/L 


 


Blood Urea Nitrogen 15 mg/dl 


 


Creatinine 1.13 mg/dl 


 


Est Creatinine Clear Calc


Drug Dose 56.2 ml/min 


 


 


Estimated GFR (


American) 59.5 


 


 


Estimated GFR (Non-


American 51.3 


 


 


BUN/Creatinine Ratio 13.0 


 


Random Glucose 87 mg/dl 


 


Calcium Level 8.5 mg/dl 











Assessment and Plan


62 year old female with anemia and melena/heme positive stool. Also admitted 

with hematuria. EGD and colonoscopy to be performed today to evaluate for a GI 

source of blood loss.


I performed a history and physical examination of the patient, including 

specifically soft abdomen I have discussed the patient's management with JAMIR Murdock.  Please refer to the PA note for the documented findings and plan 

of care.





EGD and colonoscopy today

## 2018-08-13 NOTE — FAMILY MEDICINE PROGRESS NOTE
Progress Note


Date of Service


Aug 13, 2018.





Subjective


Pt evaluation today including:  conversation w/ patient, physical exam, chart 

review, lab review, review of studies


Pain:  denies


PO Intake:  NPO


Voiding:  no voiding problems


No acute events overnight. Patient is s/p bowel prep overnight in preparation 

for Colonoscopy today.  No further bloody stools. She denies abdominal pain, n/

v fevers, chills, fatigue, sob, palpitation





   Constitutional:  No fever, No chills, No weakness


   Respiratory:  No cough, No shortness of breath


   Cardiovascular:  No chest pain, No edema


   Abdomen:  + GI bleeding, No pain, No nausea, No vomiting


   Female :  No dysuria, No urinary frequency


   Heme:  + abnormal bleeding/bruising


   Skin:  No rash, No itch





Medications





Current Inpatient Medications








 Medications


  (Trade)  Dose


 Ordered  Sig/Angely


 Route  Start Time


 Stop Time Status Last Admin


Dose Admin


 


 Albuterol


  (Ventolin Hfa


 Inhaler)  2 puffs  Q6H  PRN


 INH  8/6/18 17:45


 9/5/18 17:44   


 


 


 Atorvastatin


 Calcium


  (Lipitor Tab)  10 mg  DAILY


 PO  8/7/18 09:00


 9/6/18 08:59  8/13/18 07:52


10 MG


 


 Cholecalciferol


  (Vitamin D Tab)  1,000


 inter.unit  DAILY


 PO  8/7/18 09:00


 9/6/18 08:59  8/13/18 07:51


1,000 INTER.UNIT


 


 Lurasidone HCl


  (Latuda Tab)  60 mg  DAILY


 PO  8/7/18 09:00


 9/6/18 08:59  8/13/18 07:51


60 MG


 


 Heparin Sodium


  (Porcine)


  (Heparin Sq 5000


 Unit/0.5ml)  5,000 unit  Q12


 SQ  8/7/18 21:00


 9/6/18 20:59 Future Hold 8/9/18 08:00


5,000 UNIT


 


 Pantoprazole


 Sodium


  (Protonix Tab)  40 mg  BID


 PO  8/11/18 20:00


 9/10/18 19:59  8/13/18 07:51


40 MG











Objective


Vital Signs











  Date Time  Temp Pulse Resp B/P (MAP) Pulse Ox O2 Delivery O2 Flow Rate FiO2


 


8/13/18 07:39 36.9 69 16 108/69 (82) 96 Room Air  


 


8/13/18 00:00      Room Air  


 


8/12/18 22:43 36.8 79 16 134/82 (99) 99 Room Air  


 


8/12/18 16:00     98 Room Air  


 


8/12/18 15:24 36.9 75 18 127/77 (94) 98 Room Air  











Physical Exam


Notes:


GENERAL: alert,  no distress, non-toxic 


EYE EXAM: conjunctival pallor, PERRL and EOM's grossly intact


OROPHARYNX: no exudate, no erythema, lips, buccal mucosa, and tongue normal and 

mucous membranes are moist


NECK: supple, no nuchal rigidity, no adenopathy, non-tender


LUNGS: Clear to auscultation. Normal chest wall mechanics


HEART:systolic murmur, S1 normal and S2 normal 


ABDOMEN: abdomen soft, non-tender, normo-active bowel sounds, no masses, no 

rebound or guarding. 


BACK: Back is symmetrical on inspection and there is no deformity,


SKIN:  scattered ecchymoses


UPPER EXTREMITIES: upper extremities are grossly normal. 


LOWER EXTREMITIES: No pitting edema.


NEURO EXAM: Normal sensorium, cranial nerves II-XII grossly intact, normal 

speech,  no gross weakness of arms, no gross weakness of legs.





Laboratory Results





Results Past 24 Hours








Test


  8/13/18


08:02 Range/Units


 


 


White Blood Count 4.37 4.8-10.8  K/uL


 


Red Blood Count 2.77 4.2-5.4  M/uL


 


Hemoglobin 8.2 12.0-16.0  g/dL


 


Hematocrit 24.8 37-47  %


 


Mean Corpuscular Volume 89.5   fL


 


Mean Corpuscular Hemoglobin 29.6 25-34  pg


 


Mean Corpuscular Hemoglobin


Concent 33.1


  32-36  g/dl


 


 


Platelet Count 354 130-400  K/uL


 


Mean Platelet Volume 9.7 7.4-10.4  fL


 


Neutrophils (%) (Auto) 69.1  %


 


Lymphocytes (%) (Auto) 19.7  %


 


Monocytes (%) (Auto) 5.0  %


 


Eosinophils (%) (Auto) 5.0  %


 


Basophils (%) (Auto) 0.5  %


 


Neutrophils # (Auto) 3.02 1.4-6.5  K/uL


 


Lymphocytes # (Auto) 0.86 1.2-3.4  K/uL


 


Monocytes # (Auto) 0.22 0.11-0.59  K/uL


 


Eosinophils # (Auto) 0.22 0-0.5  K/uL


 


Basophils # (Auto) 0.02 0-0.2  K/uL


 


RDW Standard Deviation 45.0 36.4-46.3  fL


 


RDW Coefficient of Variation 13.9 11.5-14.5  %


 


Immature Granulocyte % (Auto) 0.7  %


 


Immature Granulocyte # (Auto) 0.03 0.00-0.02  K/uL


 


Red Blood Cell Morphology Unremarkable  


 


Prothrombin Time


  12.9


  9.0-12.0


SECONDS


 


Prothromb Time International


Ratio 1.2


  0.9-1.1  


 


 


Sodium Level 142 136-145  mmol/L


 


Potassium Level 3.7 3.5-5.1  mmol/L


 


Chloride Level 109   mmol/L


 


Carbon Dioxide Level 25 21-32  mmol/L


 


Anion Gap 8.0 3-11  mmol/L


 


Blood Urea Nitrogen 15 7-18  mg/dl


 


Creatinine


  1.13


  0.60-1.20


mg/dl


 


Est Creatinine Clear Calc


Drug Dose 56.2


   ml/min


 


 


Estimated GFR (


American) 59.5


   


 


 


Estimated GFR (Non-


American 51.3


   


 


 


BUN/Creatinine Ratio 13.0 10-20  


 


Random Glucose 87 70-99  mg/dl


 


Calcium Level 8.5 8.5-10.1  mg/dl











Assessment and Plan


63 yo F with PMHx of Multiple of PE's on Coumadin chronically prior to arrival 

presenting with melena, hematuria, hematuria  admitted with Anemia,  DAVID, GI 

bleed in he setting of supratherapeutic INR, current also found to have DAVID





Acute blood loss anemia, GI bleed


-possibly secondary to Coumadin overdose though denied by patient


-H/H stabilized 7.8-->8.2


-Gastroenterology consulted


-Patient  NPO, Colonoscopy Scheduled for today, F/u GI report


-Continue to Protonix  40 mg BID





Supratherapeutic INR


-s/p correction with PCC four factor,  vitamin K


-INR  1.2


-Continuing to hold warfarin with tentative plan to restart following 

colonoscopy today





DAVID: likely prerenal secondary to hypoperfusion in setting of blood loss


-8/12: U/S Kidney Impression: Bilateral hydronephrosis and dilatation of the 

proximal ureters, right greater than left.  either unchanged or slightly 

improved since CT of


August 6, 2018.  And Marked right renal atrophy.


-Cr normalized 1.21--> 1.13








Depression/Schizophrenia


-Per Psychiatric consult, patient is suspected to have intentionally overdosed 

on Coumadin after although accidental overdose cannot be ruled out. 


-Patient denies intentional Coumadin overdose or suicidal ideation


-No criteria for involuntary admission 


-Family notified to  assist in dispensing medications in smaller quantities to 

patient following discharge





Asthma


-Stable continue to monitor.





DVT PPx


- contraindicated due to acute bleeding





Code status: DNR





ADDENDUM:


s/p  EGD, Colonoscopy today. 


- EGD negative for bleed, hiatal hernia found


- Colonoscopy: poor bowel prep per GI, however brown stool throughout, no 

active bleed


                       non-bleeding hemorrhoids found


-Per GI, can restart Anticoagulation, restart diet, repeat colonoscopy in 3 

months outpatient





Resident Physician Supervision Note:





I was present with Dr. Lovett during the history and exam. I discussed the case 

with the resident and agree with the findings and plan as documented in the 

note.  Any exceptions or clarifications are listed here: She looks generally 

well today. Will restart diet to assure tolerability; restart Coumadin and 

monitor Hgb. If clinically stable overnight, anticipate discharge in the AM. 





Documented By:  Esdras Navarro


Continued [facility] stay due to:  other (pending Colonoscopy)


Discharge planning:  home with home health





Resident Tracking


Resident Involvement:  Resident Care Provided


Care Provided:  Adult Hospital Medicine

## 2018-08-13 NOTE — GI REPORT
Patient Name: Tamara Bolanos

Procedure Date: 8/13/2018 12:09 PM

MRN: [id number]

Account Number: [id number]

YOB: 1955

Admit Type: Inpatient

Age: 62

Gender: Female

Attending MD: Angelo Cavazos MD

Procedure:            Colonoscopy

Providers:            Angelo Cavazos MD

Referring MD:         Ricardo Bailey

Indications:          Heme positive stool

Medicines:            Monitored Anesthesia Care

Complications:        No immediate complications.

Estimated Blood Loss: Estimated blood loss: none.

Procedure:            Pre-Anesthesia Assessment:

                      - Prior to the procedure, a History and Physical was 

                      performed, and patient medications and allergies were 

                      reviewed. The patient is competent. The risks and 

                      benefits of the procedure and the sedation options and 

                      risks were discussed with the patient. All questions 

                      were answered and informed consent was obtained. 

                      Patient identification and proposed procedure were 

                      verified by the physician and the nurse in the 

                      procedure room. Mental Status Examination: alert and 

                      oriented. Airway Examination: normal oropharyngeal 

                      airway and neck mobility. Respiratory Examination: 

                      clear to auscultation. CV Examination: normal. ASA 

                      Grade Assessment: III - A patient with severe systemic 

                      disease. After reviewing the risks and benefits, the 

                      patient was deemed in satisfactory condition to undergo 

                      the procedure. The anesthesia plan was to use monitored 

                      anesthesia care (MAC). Immediately prior to 

                      administration of medications, the patient was 

                      re-assessed for adequacy to receive sedatives. The 

                      heart rate, respiratory rate, oxygen saturations, blood 

                      pressure, adequacy of pulmonary ventilation, and 

                      response to care were monitored throughout the 

                      procedure. The physical status of the patient was 

                      re-assessed after the procedure.

                      After I obtained informed consent, the scope was passed 

                      under direct vision. Throughout the procedure, the 

                      patient's blood pressure, pulse, and oxygen saturations 

                      were monitored continuously. The scope was introduced 

                      through the anus and advanced to the terminal ileum. 

                      The terminal ileum, ileocecal valve, appendiceal 

                      orifice, and rectum were photographed. The colonoscopy 

                      was performed without difficulty. The patient tolerated 

                      the procedure well. The quality of the bowel 

                      preparation was poor.

Findings:

     The perianal and digital rectal examinations were normal.

     The terminal ileum appeared normal.

     Non-bleeding internal hemorrhoids were found during retroflexion. The 

     hemorrhoids were small.

     There is no endoscopic evidence of bleeding in the entire colon.

Impression:           - Preparation of the colon was poor.

                      - The examined portion of the ileum was normal. No 

                      bleeding in the entire colon with brown colored stool.

                      - Non-bleeding internal hemorrhoids.

                      - No specimens collected.

Recommendation:       - [facility].

                      - Resume regular diet.

                      - No contraindication for AC from GI point.

                      - Repeat colonoscopy in 3 months because the bowel 

                      preparation was poor.

                      - Recall GI if needed.

Angelo Cavazos MD

8/13/2018 12:49:19 PM

This report has been signed electronically.

Note Initiated On: 8/13/2018 12:09 PM

Number of Addenda: 0

     I attest to the content of the Intraoperative Record and orders 

     documented therein, exceptions below



{[id number]}

## 2018-08-13 NOTE — GI REPORT
Patient Name: Tamara Bolanos

Procedure Date: 8/13/2018 12:10 PM

MRN: [id number]

Account Number: [id number]

YOB: 1955

Admit Type: Inpatient

Age: 62

Gender: Female

Attending MD: Angelo Cavazos MD

Procedure:            Upper GI endoscopy

Providers:            Angelo Cavazos MD

Referring MD:         Ricardo Bailey

Indications:          Heme positive stool

Medicines:            Monitored Anesthesia Care

Complications:        No immediate complications.

Estimated Blood Loss: Estimated blood loss: none.

Procedure:            Pre-Anesthesia Assessment:

                      - Prior to the procedure, a History and Physical was 

                      performed, and patient medications and allergies were 

                      reviewed. The patient is competent. The risks and 

                      benefits of the procedure and the sedation options and 

                      risks were discussed with the patient. All questions 

                      were answered and informed consent was obtained. 

                      Patient identification and proposed procedure were 

                      verified by the physician and the nurse in the 

                      procedure room. Mental Status Examination: alert and 

                      oriented. Airway Examination: normal oropharyngeal 

                      airway and neck mobility. Respiratory Examination: 

                      clear to auscultation. CV Examination: normal. ASA 

                      Grade Assessment: III - A patient with severe systemic 

                      disease. After reviewing the risks and benefits, the 

                      patient was deemed in satisfactory condition to undergo 

                      the procedure. The anesthesia plan was to use monitored 

                      anesthesia care (MAC). Immediately prior to 

                      administration of medications, the patient was 

                      re-assessed for adequacy to receive sedatives. The 

                      heart rate, respiratory rate, oxygen saturations, blood 

                      pressure, adequacy of pulmonary ventilation, and 

                      response to care were monitored throughout the 

                      procedure. The physical status of the patient was 

                      re-assessed after the procedure.

                      After obtaining informed consent, the endoscope was 

                      passed under direct vision. Throughout the procedure, 

                      the patient's blood pressure, pulse, and oxygen 

                      saturations were monitored continuously. The scope was 

                      introduced through the mouth, and advanced to the 

                      second part of duodenum. The upper GI endoscopy was 

                      accomplished without difficulty. The patient tolerated 

                      the procedure well.

Findings:

     A hiatal hernia was found. The proximal extent of the gastric folds (end 

     of tubular esophagus) was 33 cm from the incisors. The hiatal narrowing 

     was 35 cm from the incisors.

     The entire examined stomach was normal.

     The duodenal bulb and second portion of the duodenum were normal.

Impression:           - Hiatal hernia.

                      - Normal stomach.

                      - Normal duodenal bulb and second portion of the 

                      duodenum.

                      - No specimens collected.

Recommendation:       - Perform a colonoscopy today.

Angelo Cavazos MD

8/13/2018 12:46:06 PM

This report has been signed electronically.

Note Initiated On: 8/13/2018 12:10 PM

Number of Addenda: 0

     I attest to the content of the Intraoperative Record and orders 

     documented therein, exceptions below



{[id number]}

## 2018-08-13 NOTE — ANESTHESIOLOGY PROGRESS NOTE
Anesthesia Post Op Note


Date & Time


Aug 13, 2018 at 12:54





Vital Signs


Pain Intensity:  0.0





Vital Signs Past 12 Hours








  Date Time  Temp Pulse Resp B/P (MAP) Pulse Ox O2 Delivery O2 Flow Rate FiO2


 


8/13/18 11:57 37 73 16 135/78 (97) 96 Room Air  


 


8/13/18 08:00      Room Air  


 


8/13/18 07:39 36.9 69 16 108/69 (82) 96 Room Air  











Notes


Mental Status:  alert / awake / arousable, participated in evaluation


Pt Amnestic to Procedure:  Yes


Nausea / Vomiting:  adequately controlled


Pain:  adequately controlled


Airway Patency, RR, SpO2:  stable & adequate


BP & HR:  stable & adequate


Hydration State:  stable & adequate


Anesthetic Complications:  no major complications apparent

## 2018-08-14 VITALS
DIASTOLIC BLOOD PRESSURE: 78 MMHG | HEART RATE: 78 BPM | TEMPERATURE: 98.42 F | OXYGEN SATURATION: 98 % | SYSTOLIC BLOOD PRESSURE: 137 MMHG

## 2018-08-14 VITALS
HEART RATE: 78 BPM | DIASTOLIC BLOOD PRESSURE: 78 MMHG | TEMPERATURE: 98.42 F | SYSTOLIC BLOOD PRESSURE: 137 MMHG | OXYGEN SATURATION: 98 %

## 2018-08-14 VITALS — OXYGEN SATURATION: 98 %

## 2018-08-14 LAB
BASOPHILS # BLD: 0.02 K/UL (ref 0–0.2)
BASOPHILS NFR BLD: 0.4 %
BUN SERPL-MCNC: 22 MG/DL (ref 7–18)
CALCIUM SERPL-MCNC: 8.4 MG/DL (ref 8.5–10.1)
CO2 SERPL-SCNC: 23 MMOL/L (ref 21–32)
CREAT SERPL-MCNC: 1.28 MG/DL (ref 0.6–1.2)
EOS ABS #: 0.19 K/UL (ref 0–0.5)
EOSINOPHIL NFR BLD AUTO: 378 K/UL (ref 130–400)
GLUCOSE SERPL-MCNC: 98 MG/DL (ref 70–99)
HCT VFR BLD CALC: 24.8 % (ref 37–47)
HGB BLD-MCNC: 8.3 G/DL (ref 12–16)
IG#: 0.04 K/UL (ref 0–0.02)
IMM GRANULOCYTES NFR BLD AUTO: 17.8 %
INR PPP: 1.2 (ref 0.9–1.1)
LYMPHOCYTES # BLD: 0.91 K/UL (ref 1.2–3.4)
MCH RBC QN AUTO: 30.1 PG (ref 25–34)
MCHC RBC AUTO-ENTMCNC: 33.5 G/DL (ref 32–36)
MCV RBC AUTO: 89.9 FL (ref 80–100)
MONO ABS #: 0.32 K/UL (ref 0.11–0.59)
MONOCYTES NFR BLD: 6.3 %
NEUT ABS #: 3.64 K/UL (ref 1.4–6.5)
NEUTROPHILS # BLD AUTO: 3.7 %
NEUTROPHILS NFR BLD AUTO: 71 %
PMV BLD AUTO: 9.7 FL (ref 7.4–10.4)
POTASSIUM SERPL-SCNC: 3.8 MMOL/L (ref 3.5–5.1)
RED CELL DISTRIBUTION WIDTH CV: 14 % (ref 11.5–14.5)
RED CELL DISTRIBUTION WIDTH SD: 45.5 FL (ref 36.4–46.3)
SODIUM SERPL-SCNC: 139 MMOL/L (ref 136–145)
WBC # BLD AUTO: 5.12 K/UL (ref 4.8–10.8)

## 2018-08-14 RX ADMIN — PANTOPRAZOLE SCH MG: 40 TABLET, DELAYED RELEASE ORAL at 08:01

## 2018-08-14 RX ADMIN — Medication SCH INTER.UNIT: at 08:01

## 2018-08-14 RX ADMIN — ATORVASTATIN CALCIUM SCH MG: 10 TABLET, FILM COATED ORAL at 08:01

## 2018-08-14 RX ADMIN — LURASIDONE HYDROCHLORIDE SCH MG: 40 TABLET, FILM COATED ORAL at 08:02

## 2018-08-14 NOTE — DISCHARGE SUMMARY
Discharge Summary


Date of Service


Aug 14, 2018.





Discharge Summary


Admission Date:


Aug 6, 2018 at 18:14


Discharge Date:  Aug 14, 2018


Discharge Disposition:  Home


Principal Diagnosis:  Anemia, Supratherqpeutic INR,


Problems/Secondary Diagnoses:


DAVID


Immunizations:  


   Have You Had Influenza Vaccine:  N/A


   History of Tetanus Vaccine?:  No


   History of Pneumococcal:  Yes


   History of Hepatitis B Vaccine:  No


Procedures:


RENAL ULTRASOUND





CLINICAL HISTORY: Supratherapeutic INR and Renal hemorrhage. 





COMPARISON STUDY:  CT of the abdomen and pelvis August 6, 2018.





TECHNIQUE:  Sonography of the kidneys and the urinary bladder was performed.





FINDINGS: Exam is compromised by suboptimal penetration. Marked right renal


atrophy is noted. Right hydronephrosis and dilatation of the proximal right


ureter is likely similar to CT of August 6, 2018 when allowing for differences


in technique. Mild left hydronephrosis is either unchanged or slightly improved.


The right kidney measures 7 cm in maximal dimension and the left measures 11.6


cm. The bladder was not well visualized on this examination.





IMPRESSION: 





1. Bilateral hydronephrosis and dilatation of the proximal ureters, right


greater than left. This is either unchanged or slightly improved since CT of


August 6, 2018.





2. Marked right renal atrophy.











Electronically signed by:  Fahad Jimenes M.D.


8/12/2018 3:40 PM





Dictated Date/Time:  8/12/2018 3:37 PM








[~ rep ct add3]]


CHEST ONE VIEW PORTABLE





CLINICAL HISTORY: chest pain dyspnea





COMPARISON STUDY:  6/6/2018





FINDINGS: Lungs are clear. Postoperative changes left axilla. Diaphragms smooth.








IMPRESSION:  No acute process. 

















The above report was generated using voice recognition software.  It may contain


grammatical, syntax or spelling errors.














Electronically signed by:  Avery Howard M.D.


8/6/2018 5:25 PM





Dictated Date/Time:  8/6/2018 5:25 PM





ABD/PELVIS NO IV OR ORAL CONT





CT DOSE: 691.69 mGy.cm





HISTORY: Pain  abdominal pain, hematuria, hematochezia, on coumadin





TECHNIQUE: Multiaxial CT images of the abdomen and pelvis were performed without


contrast.  A dose lowering technique was utilized adhering to the principles of


ALARA.








COMPARISON STUDY: None.





FINDINGS: Lung bases are clear. Liver is homogeneous throughout. Pancreas


appears unremarkable. Gallbladder is contracted.





Both kidneys are enlarged compared to the prior exam. There is high density


material within the renal collecting systems. There are findings of perinephric


infiltrative changes bilaterally.





Both ureters are moderately distended and again with high density material


within. This presumably represents blood. Bladder is relatively collapsed. There


are nonspecific infiltrative changes of the subcutaneous fat over the left


inguinal region. There is trace amount of free fluid within the pelvic


cul-de-sac.





IMPRESSION: 





1. Interval increase in size of both kidneys demonstrating diffuse heterogeneity


as well as considerable infiltrative change of the perinephric fat]


2. In addition, there is distention of the renal collecting systems with


hyperdense material with dilatation of the ureters throughout their length again


containing high density material components.





Trace amount of free fluid within the pelvic cul-de-sac.


3. Nonobstructive bowel pattern.








4. This appearance is most consistent with that of spontaneous bilateral renal


hemorrhage with extension of blood to the perinephric spaces as well as the


renal collecting systems and ureters.














The above report was generated using voice recognition software.  It may contain


grammatical, syntax or spelling errors.











Electronically signed by:  Avery Howard M.D.


8/6/2018 3:52 PM





Dictated Date/Time:  8/6/2018 3:44 PM


Consultations:


Gastroenterology


Nephrology


Urology


Psychiatry





Discharge Exam


No acute events overnight. She denies gross blood in stools.  She denies 

abdominal pain, n/v, diarrhea, fatigue, weakness, palpitation, SOB, chest pain


Review of Systems:  


   Constitutional:  No fever, No chills


   Respiratory:  No cough, No sputum, No shortness of breath


   Cardiovascular:  No chest pain, No edema, No palpitations


   Abdomen:  + diarrhea, + GI bleeding (resolved), No pain, No nausea, No 

vomiting


   Genitourinary - Female:  No dysuria, No urinary frequency, No urinary urgency


   Neurologic:  No weakness, No numbness/tingling


   Hematologic / Lymphatic:  + abnormal bleeding/bruising (while on COumadin)


   Integumentary:  No rash, No itch





Hospital Course


H&P





Ms. Bolanos is a 61yo female with history of multiple PEs on lifelong 

anticoagulation with Coumadin presenting with elevated INR of > 10 as well as 

spontaneous bleeding.  Patient reports 1 week of decreased energy as well as 

hematuria, bright red and sometimes pink, no clots and melena x 3 episodes (3 

August, 5 August and this AM).  Also with diffuse body pain, back pain and 

joint pain and lower abdominal pain.  She has been experiencing shortness of 

breath and presyncope over the last 4 days as well as easy bruising.  Patient 

reports not taking her Coumadin for the last 3 days due to concern for 

bleeding.  





On arrival to the ER her INR was found to be > 10.  Last INR 2 weeks ago was 

2.4.  IV Vitamin K was administered with complaint of flushing





ER Course:  PCC, Vitamin K 10mg IV, Vitamin K 5mg PO, NSS x 2 liters








Course








Acute blood loss anemia, History of Melena 





-positive heme occult in ED


-possibly secondary to Coumadin overdose  though denied by patient, though may 

have been due to confusion as oppsoed to intentional overdose


-Hgb 10.1 dropped as low as 7.2, stabilized to 8.3 without transfusion on 

discharge


-Gastroenterology consulted on arrival


-Colonoscopy had poor bowel prep per GI however only bown stool was seen and no 

source of active bleed. Non-bleeding internal hemorrhoids were noted


-EGD showed no active bleeding source. Hiatal hernia was noted.


-Close Follow up with [facility] clinic scheduled following discharge 

after discussion with Dr. Delcid 





Supratherapeutic INR: secondary to excess Coumadin consumption





- corrected 8/9 with PCC four factor,  vitamin K on arrival


- Vit K administered subsequently on 8/9 due to rising INR


-Warfarin held on arrival, restarted prior to discharge after no further 

evidence of on ongoing bleed





DAVID: likely prerenal secondary to hypoperfusion in setting of blood loss





-8/12: U/S Kidney Impression: Bilateral hydronephrosis and dilatation of the 

proximal ureters, right greater than left.  either unchanged or slightly 

improved since CT of


August 6, 2018.  And Marked right renal atrophy.


-Cr significantly improved by discharge








Depression/Schizophrenia





-Per Psychiatric consult, patient possibly  intentionally overdosed on Coumadin 

after although accidental overdose cannot be ruled out. 


-Patient denies intentional Coumadin overdose or suicidal ideation


-No criteria identified for involuntary admission 


-Family notified to  assist in dispensing medications in smaller quantities to 

patient following discharge





Asthma


-Stable continue to monitor.





DVT PPx


- contraindicated due to acute bleeding





Code status: DNR





Resident Physician Supervision Note:





I was present with Dr. Lovett during the history and exam. I discussed the case 

with the resident and agree with the findings and plan as documented in the 

note.  Any exceptions or clarifications are listed here: The patient's 

hemoglobin has remained stable and is no evidence of recurrent bleeding.  Will 

begin her Coumadin dosing; did discuss the need to take the medicine as 

prescribed to be careful with regards inadvertent extra or missed doses.  Was 

discussed with the Coumadin clinic; will continue warfarin therapy but limited 

prescriptions in terms of number of tabs going forward.  If the patient notes 

recurrence of bleeding, she will contact her PCP; if severe in nature she will 

return to the emergency department.





Documented By:  Esdras Navarro


Total Time Spent:  Less than 30 minutes


This includes examination of the patient, discharge planning, medication 

reconciliation, and communication with other providers.





Discharge Instructions


Please refer to the electronic Patient Visit Report (Discharge Instructions) 

for additional information.





Resident Tracking


Resident Involvement:  Resident Care Provided


Care Provided:  Adult Hospital Medicine

## 2018-08-14 NOTE — DISCHARGE INSTRUCTIONS
Discharge Instructions


Date of Service


Aug 14, 2018.





Admission


Reason for Admission:  Elevated Inr





Discharge


Discharge Diagnosis / Problem:  Anemia, Elevated INR





Discharge Goals


Goal(s):  Decrease discomfort, Improve function, Increase independence, Improve 

disease control, Improve nutritional status, Learn about illness, Diagnostic 

testing, Therapeutic intervention, Screening, Prevent Disease Progression, 

Specific goals





Activity Recommendations


Activity Limitations:  resume your previous activity





.





Instructions / Follow-Up


Instructions / Follow-Up


You arrived with history of Gastrointestinal bleeding and Anemia and an 

elevated INR (indicating your blood was too anticoagulated). Your Red blood 

cell counts stabilized during your hospital stay and you had a colonoscopy and 

an endoscopy which did not show an active bleed. 





-Please follow up at previously scheduled appointment with [facility] with Dr. Delcid.


-Please follow up with your primary care provider within 1 week.


-You will be placed your previous regimen of Coumadin on discharge. Please take 

only as prescribed. 


-You will need a repeat Colonoscopy on discharge in 3 months. You should 

receive a call from [facility] to confirm appointment if you do not hear from 

[facility] regarding this, Please call [phone] to confirm appointment.


-Please follow up with your primary care provider in 1 week for repeat blood 

counts (Hemoglobin and Hematocrit)








Call 911 for any of the following:





You have shortness of breath or trouble breathing.


You faint or lose consciousness.


You have chest pain.





Return to the emergency department if:





You feel dizzy or are too weak to stand.


Your heart is beating faster than usual.


You vomit blood, or your vomit looks like coffee grounds.


You have blood in your bowel movement.


You have abdominal pain or swelling.





Contact your healthcare provider if:





You have bowel movements that are tarry or black.


You have nausea or are vomiting.


You have heartburn.


You have questions or concerns about your condition or care





Current Hospital Diet


Patient's current hospital diet: Regular Diet





Discharge Diet


Recommended Diet:  Regular Diet





Procedures


Procedures Performed:  


EGD, COLONOSCOPY





Pending Studies


Studies pending at discharge:  no





Medical Emergencies








.


Who to Call and When:





Medical Emergencies:  If at any time you feel your situation is an emergency, 

please call 911 immediately.





.





Non-Emergent Contact


Non-Emergency issues call your:  Primary Care Provider, Gastroenterologist, 

Specialist (Hematologist)


Call Non-Emergent contact if:  you have a fever, your pain is unusual for you, 

your pain is concerning you, you have any medication questions





.


.








"Provider Documentation" section prepared by Robin Lovett.








.

## 2018-08-20 ENCOUNTER — HOSPITAL ENCOUNTER (OUTPATIENT)
Dept: HOSPITAL 45 - C.LAB | Age: 63
Discharge: HOME | End: 2018-08-20
Attending: FAMILY MEDICINE
Payer: COMMERCIAL

## 2018-08-20 DIAGNOSIS — D64.9: ICD-10-CM

## 2018-08-20 DIAGNOSIS — R79.1: Primary | ICD-10-CM

## 2018-08-20 LAB
HCT VFR BLD CALC: 31.3 % (ref 37–47)
HGB BLD-MCNC: 10 G/DL (ref 12–16)

## 2022-10-12 NOTE — DIAGNOSTIC IMAGING REPORT
RENAL ULTRASOUND



CLINICAL HISTORY: Supratherapeutic INR and Renal hemorrhage. 



COMPARISON STUDY:  CT of the abdomen and pelvis August 6, 2018.



TECHNIQUE:  Sonography of the kidneys and the urinary bladder was performed.



FINDINGS: Exam is compromised by suboptimal penetration. Marked right renal

atrophy is noted. Right hydronephrosis and dilatation of the proximal right

ureter is likely similar to CT of August 6, 2018 when allowing for differences

in technique. Mild left hydronephrosis is either unchanged or slightly improved.

The right kidney measures 7 cm in maximal dimension and the left measures 11.6

cm. The bladder was not well visualized on this examination.



IMPRESSION: 



1. Bilateral hydronephrosis and dilatation of the proximal ureters, right

greater than left. This is either unchanged or slightly improved since CT of

August 6, 2018.



2. Marked right renal atrophy.







Electronically signed by:  Fahad Jimenes M.D.

8/12/2018 3:40 PM



Dictated Date/Time:  8/12/2018 3:37 PM 5-Fu Counseling: 5-Fluorouracil Counseling:  I discussed with the patient the risks of 5-fluorouracil including but not limited to erythema, scaling, itching, weeping, crusting, and pain.